# Patient Record
Sex: FEMALE | Race: WHITE | NOT HISPANIC OR LATINO | Employment: FULL TIME | URBAN - METROPOLITAN AREA
[De-identification: names, ages, dates, MRNs, and addresses within clinical notes are randomized per-mention and may not be internally consistent; named-entity substitution may affect disease eponyms.]

---

## 2015-03-27 LAB
HPV_EXT - HISTORICAL: NORMAL
PAP SMEAR - HIM PATIENT REPORTED: ABNORMAL

## 2017-01-11 ENCOUNTER — COMMUNICATION - HEALTHEAST (OUTPATIENT)
Dept: INTERNAL MEDICINE | Facility: CLINIC | Age: 47
End: 2017-01-11

## 2017-01-11 DIAGNOSIS — J32.9 SINUSITIS: ICD-10-CM

## 2017-10-11 ENCOUNTER — OFFICE VISIT - HEALTHEAST (OUTPATIENT)
Dept: INTERNAL MEDICINE | Facility: CLINIC | Age: 47
End: 2017-10-11

## 2017-10-11 DIAGNOSIS — J32.0 MAXILLARY SINUSITIS: ICD-10-CM

## 2017-10-11 ASSESSMENT — MIFFLIN-ST. JEOR: SCORE: 1145.91

## 2018-02-15 ENCOUNTER — COMMUNICATION - HEALTHEAST (OUTPATIENT)
Dept: SCHEDULING | Facility: CLINIC | Age: 48
End: 2018-02-15

## 2018-02-19 ENCOUNTER — COMMUNICATION - HEALTHEAST (OUTPATIENT)
Dept: INTERNAL MEDICINE | Facility: CLINIC | Age: 48
End: 2018-02-19

## 2018-02-19 ENCOUNTER — RECORDS - HEALTHEAST (OUTPATIENT)
Dept: GENERAL RADIOLOGY | Facility: CLINIC | Age: 48
End: 2018-02-19

## 2018-02-19 ENCOUNTER — OFFICE VISIT - HEALTHEAST (OUTPATIENT)
Dept: INTERNAL MEDICINE | Facility: CLINIC | Age: 48
End: 2018-02-19

## 2018-02-19 DIAGNOSIS — J32.9 SINUSITIS: ICD-10-CM

## 2018-02-19 DIAGNOSIS — R05.9 COUGH: ICD-10-CM

## 2018-02-19 DIAGNOSIS — J40 BRONCHITIS: ICD-10-CM

## 2018-02-19 ASSESSMENT — MIFFLIN-ST. JEOR: SCORE: 1114.16

## 2018-03-01 ENCOUNTER — COMMUNICATION - HEALTHEAST (OUTPATIENT)
Dept: SCHEDULING | Facility: CLINIC | Age: 48
End: 2018-03-01

## 2018-03-27 ENCOUNTER — RECORDS - HEALTHEAST (OUTPATIENT)
Dept: ADMINISTRATIVE | Facility: OTHER | Age: 48
End: 2018-03-27

## 2018-03-27 ENCOUNTER — TRANSFERRED RECORDS (OUTPATIENT)
Dept: HEALTH INFORMATION MANAGEMENT | Facility: CLINIC | Age: 48
End: 2018-03-27

## 2018-04-13 ENCOUNTER — TRANSFERRED RECORDS (OUTPATIENT)
Dept: HEALTH INFORMATION MANAGEMENT | Facility: CLINIC | Age: 48
End: 2018-04-13

## 2018-04-13 ENCOUNTER — RECORDS - HEALTHEAST (OUTPATIENT)
Dept: ADMINISTRATIVE | Facility: OTHER | Age: 48
End: 2018-04-13

## 2018-05-08 ENCOUNTER — TRANSFERRED RECORDS (OUTPATIENT)
Dept: HEALTH INFORMATION MANAGEMENT | Facility: CLINIC | Age: 48
End: 2018-05-08

## 2018-05-08 ENCOUNTER — RECORDS - HEALTHEAST (OUTPATIENT)
Dept: ADMINISTRATIVE | Facility: OTHER | Age: 48
End: 2018-05-08

## 2018-05-23 ENCOUNTER — RECORDS - HEALTHEAST (OUTPATIENT)
Dept: GENERAL RADIOLOGY | Facility: CLINIC | Age: 48
End: 2018-05-23

## 2018-05-23 ENCOUNTER — OFFICE VISIT - HEALTHEAST (OUTPATIENT)
Dept: INTERNAL MEDICINE | Facility: CLINIC | Age: 48
End: 2018-05-23

## 2018-05-23 DIAGNOSIS — R07.89 OTHER CHEST PAIN: ICD-10-CM

## 2018-05-23 DIAGNOSIS — Z12.31 VISIT FOR SCREENING MAMMOGRAM: ICD-10-CM

## 2018-05-23 DIAGNOSIS — I80.9 SUPERFICIAL PHLEBITIS: ICD-10-CM

## 2018-05-23 DIAGNOSIS — I83.90 VARICOSE VEIN OF LEG: ICD-10-CM

## 2018-05-23 DIAGNOSIS — R07.89 CHEST WALL PAIN: ICD-10-CM

## 2018-05-23 LAB
ANION GAP SERPL CALCULATED.3IONS-SCNC: 13 MMOL/L (ref 5–18)
BUN SERPL-MCNC: 12 MG/DL (ref 8–22)
CALCIUM SERPL-MCNC: 9.6 MG/DL (ref 8.5–10.5)
CHLORIDE BLD-SCNC: 103 MMOL/L (ref 98–107)
CO2 SERPL-SCNC: 24 MMOL/L (ref 22–31)
CREAT SERPL-MCNC: 0.71 MG/DL (ref 0.6–1.1)
D DIMER PPP FEU-MCNC: <=0.27 FEU UG/ML
ERYTHROCYTE [DISTWIDTH] IN BLOOD BY AUTOMATED COUNT: 10.8 % (ref 11–14.5)
GFR SERPL CREATININE-BSD FRML MDRD: >60 ML/MIN/1.73M2
GLUCOSE BLD-MCNC: 79 MG/DL (ref 70–125)
HCT VFR BLD AUTO: 41.5 % (ref 35–47)
HGB BLD-MCNC: 13.9 G/DL (ref 12–16)
MCH RBC QN AUTO: 30.9 PG (ref 27–34)
MCHC RBC AUTO-ENTMCNC: 33.4 G/DL (ref 32–36)
MCV RBC AUTO: 93 FL (ref 80–100)
PLATELET # BLD AUTO: 205 THOU/UL (ref 140–440)
PMV BLD AUTO: 7.6 FL (ref 7–10)
POTASSIUM BLD-SCNC: 3.9 MMOL/L (ref 3.5–5)
RBC # BLD AUTO: 4.48 MILL/UL (ref 3.8–5.4)
SODIUM SERPL-SCNC: 140 MMOL/L (ref 136–145)
WBC: 6.8 THOU/UL (ref 4–11)

## 2018-05-23 ASSESSMENT — MIFFLIN-ST. JEOR: SCORE: 1136.84

## 2019-02-06 ENCOUNTER — OFFICE VISIT - HEALTHEAST (OUTPATIENT)
Dept: INTERNAL MEDICINE | Facility: CLINIC | Age: 49
End: 2019-02-06

## 2019-02-06 DIAGNOSIS — J01.90 ACUTE SINUSITIS, RECURRENCE NOT SPECIFIED, UNSPECIFIED LOCATION: ICD-10-CM

## 2019-02-06 ASSESSMENT — MIFFLIN-ST. JEOR: SCORE: 1164.24

## 2019-02-13 ENCOUNTER — COMMUNICATION - HEALTHEAST (OUTPATIENT)
Dept: INTERNAL MEDICINE | Facility: CLINIC | Age: 49
End: 2019-02-13

## 2019-05-07 ENCOUNTER — TRANSFERRED RECORDS (OUTPATIENT)
Dept: HEALTH INFORMATION MANAGEMENT | Facility: CLINIC | Age: 49
End: 2019-05-07

## 2019-05-07 ENCOUNTER — RECORDS - HEALTHEAST (OUTPATIENT)
Dept: ADMINISTRATIVE | Facility: OTHER | Age: 49
End: 2019-05-07

## 2019-05-17 ENCOUNTER — OFFICE VISIT - HEALTHEAST (OUTPATIENT)
Dept: INTERNAL MEDICINE | Facility: CLINIC | Age: 49
End: 2019-05-17

## 2019-05-17 DIAGNOSIS — Z13.220 SCREENING FOR HYPERLIPIDEMIA: ICD-10-CM

## 2019-05-17 DIAGNOSIS — Z23 IMMUNIZATION DUE: ICD-10-CM

## 2019-05-17 DIAGNOSIS — H40.9 GLAUCOMA OF BOTH EYES, UNSPECIFIED GLAUCOMA TYPE: ICD-10-CM

## 2019-05-17 DIAGNOSIS — H65.491 CHRONIC MEE (MIDDLE EAR EFFUSION), RIGHT: ICD-10-CM

## 2019-05-17 DIAGNOSIS — Z00.00 ROUTINE GENERAL MEDICAL EXAMINATION AT A HEALTH CARE FACILITY: ICD-10-CM

## 2019-05-17 DIAGNOSIS — Z13.1 SCREENING FOR DIABETES MELLITUS: ICD-10-CM

## 2019-05-17 DIAGNOSIS — I73.00 RAYNAUD'S DISEASE WITHOUT GANGRENE: ICD-10-CM

## 2019-05-17 LAB
ALBUMIN SERPL-MCNC: 4.5 G/DL (ref 3.5–5)
ALP SERPL-CCNC: 49 U/L (ref 45–120)
ALT SERPL W P-5'-P-CCNC: 25 U/L (ref 0–45)
ANION GAP SERPL CALCULATED.3IONS-SCNC: 13 MMOL/L (ref 5–18)
AST SERPL W P-5'-P-CCNC: 26 U/L (ref 0–40)
BILIRUB SERPL-MCNC: 0.5 MG/DL (ref 0–1)
BUN SERPL-MCNC: 13 MG/DL (ref 8–22)
CALCIUM SERPL-MCNC: 9.8 MG/DL (ref 8.5–10.5)
CHLORIDE BLD-SCNC: 102 MMOL/L (ref 98–107)
CHOLEST SERPL-MCNC: 239 MG/DL
CO2 SERPL-SCNC: 24 MMOL/L (ref 22–31)
CREAT SERPL-MCNC: 0.72 MG/DL (ref 0.6–1.1)
ERYTHROCYTE [DISTWIDTH] IN BLOOD BY AUTOMATED COUNT: 10.8 % (ref 11–14.5)
ERYTHROCYTE [SEDIMENTATION RATE] IN BLOOD BY WESTERGREN METHOD: 3 MM/HR (ref 0–20)
FASTING STATUS PATIENT QL REPORTED: YES
GFR SERPL CREATININE-BSD FRML MDRD: >60 ML/MIN/1.73M2
GLUCOSE BLD-MCNC: 77 MG/DL (ref 70–125)
HBA1C MFR BLD: 4.8 % (ref 3.5–6)
HCT VFR BLD AUTO: 43.1 % (ref 35–47)
HDLC SERPL-MCNC: 66 MG/DL
HGB BLD-MCNC: 14.8 G/DL (ref 12–16)
HIV 1+2 AB+HIV1 P24 AG SERPL QL IA: NEGATIVE
LDLC SERPL CALC-MCNC: 159 MG/DL
MCH RBC QN AUTO: 30.9 PG (ref 27–34)
MCHC RBC AUTO-ENTMCNC: 34.3 G/DL (ref 32–36)
MCV RBC AUTO: 90 FL (ref 80–100)
PLATELET # BLD AUTO: 230 THOU/UL (ref 140–440)
PMV BLD AUTO: 7.4 FL (ref 7–10)
POTASSIUM BLD-SCNC: 4.4 MMOL/L (ref 3.5–5)
PROT SERPL-MCNC: 7.2 G/DL (ref 6–8)
RBC # BLD AUTO: 4.77 MILL/UL (ref 3.8–5.4)
SODIUM SERPL-SCNC: 139 MMOL/L (ref 136–145)
TRIGL SERPL-MCNC: 68 MG/DL
TSH SERPL DL<=0.005 MIU/L-ACNC: 1.45 UIU/ML (ref 0.3–5)
WBC: 5.3 THOU/UL (ref 4–11)

## 2019-05-17 ASSESSMENT — MIFFLIN-ST. JEOR: SCORE: 1154.98

## 2019-05-18 LAB — HCV AB SERPL QL IA: NEGATIVE

## 2019-05-21 LAB — ANA SER QL: 0.4 U

## 2019-05-23 ENCOUNTER — RECORDS - HEALTHEAST (OUTPATIENT)
Dept: ADMINISTRATIVE | Facility: OTHER | Age: 49
End: 2019-05-23

## 2019-09-13 ENCOUNTER — COMMUNICATION - HEALTHEAST (OUTPATIENT)
Dept: SCHEDULING | Facility: CLINIC | Age: 49
End: 2019-09-13

## 2019-10-30 ENCOUNTER — OFFICE VISIT - HEALTHEAST (OUTPATIENT)
Dept: INTERNAL MEDICINE | Facility: CLINIC | Age: 49
End: 2019-10-30

## 2019-10-30 DIAGNOSIS — Z12.31 VISIT FOR SCREENING MAMMOGRAM: ICD-10-CM
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
## 2019-10-30 DIAGNOSIS — S61.239A HYPODERMIC NEEDLESTICK INJURY OF FINGER: ICD-10-CM

## 2019-10-30 DIAGNOSIS — Z87.01 HISTORY OF PNEUMONIA: ICD-10-CM

## 2019-10-30 DIAGNOSIS — B02.9 HERPES ZOSTER WITHOUT COMPLICATION: ICD-10-CM

## 2019-10-30 DIAGNOSIS — I83.12 VARICOSE VEINS OF LEFT LOWER EXTREMITY WITH INFLAMMATION: ICD-10-CM

## 2019-10-30 LAB — HIV 1+2 AB+HIV1 P24 AG SERPL QL IA: NEGATIVE

## 2019-10-30 ASSESSMENT — MIFFLIN-ST. JEOR: SCORE: 1150.45

## 2019-10-31 LAB — HCV AB SERPL QL IA: NEGATIVE

## 2019-12-16 ENCOUNTER — RECORDS - HEALTHEAST (OUTPATIENT)
Dept: ADMINISTRATIVE | Facility: OTHER | Age: 49
End: 2019-12-16

## 2020-02-25 ENCOUNTER — RECORDS - HEALTHEAST (OUTPATIENT)
Dept: HEALTH INFORMATION MANAGEMENT | Facility: CLINIC | Age: 50
End: 2020-02-25

## 2020-06-10 ENCOUNTER — RECORDS - HEALTHEAST (OUTPATIENT)
Dept: ADMINISTRATIVE | Facility: OTHER | Age: 50
End: 2020-06-10

## 2020-06-10 ENCOUNTER — TRANSFERRED RECORDS (OUTPATIENT)
Dept: HEALTH INFORMATION MANAGEMENT | Facility: CLINIC | Age: 50
End: 2020-06-10

## 2020-06-12 ENCOUNTER — TRANSFERRED RECORDS (OUTPATIENT)
Dept: HEALTH INFORMATION MANAGEMENT | Facility: CLINIC | Age: 50
End: 2020-06-12

## 2020-06-13 LAB
ALT SERPL W/O P-5'-P-CCNC: 12 IU/L (ref 0–32)
AST SERPL-CCNC: 16 IU/L (ref 0–40)
CHOLEST SERPL-MCNC: 208 MG/DL (ref 100–199)
CREAT SERPL-MCNC: 0.64 MG/DL (ref 0.57–1)
HDLC SERPL-MCNC: 60 MG/DL
LDLC SERPL CALC-MCNC: 131 MG/DL (ref 0–99)
TRIGLYCERIDES (HISTORICAL CONVERSION): 83 MG/DL (ref 0–149)

## 2020-06-29 ENCOUNTER — OFFICE VISIT - HEALTHEAST (OUTPATIENT)
Dept: INTERNAL MEDICINE | Facility: CLINIC | Age: 50
End: 2020-06-29

## 2020-06-29 ENCOUNTER — COMMUNICATION - HEALTHEAST (OUTPATIENT)
Dept: SCHEDULING | Facility: CLINIC | Age: 50
End: 2020-06-29

## 2020-06-29 DIAGNOSIS — Z20.822 ENCOUNTER FOR LABORATORY TESTING FOR COVID-19 VIRUS: ICD-10-CM

## 2020-06-29 DIAGNOSIS — R07.1 CHEST PAIN ON BREATHING: ICD-10-CM

## 2020-06-29 LAB
ATRIAL RATE - MUSE: 54 BPM
DIASTOLIC BLOOD PRESSURE - MUSE: NORMAL
INTERPRETATION ECG - MUSE: NORMAL
P AXIS - MUSE: 68 DEGREES
PR INTERVAL - MUSE: 192 MS
QRS DURATION - MUSE: 84 MS
QT - MUSE: 414 MS
QTC - MUSE: 392 MS
R AXIS - MUSE: 75 DEGREES
SYSTOLIC BLOOD PRESSURE - MUSE: NORMAL
T AXIS - MUSE: 48 DEGREES
VENTRICULAR RATE- MUSE: 54 BPM

## 2020-06-29 ASSESSMENT — MIFFLIN-ST. JEOR: SCORE: 1150.45

## 2020-07-02 ENCOUNTER — COMMUNICATION - HEALTHEAST (OUTPATIENT)
Dept: LAB | Facility: CLINIC | Age: 50
End: 2020-07-02

## 2020-07-06 ENCOUNTER — AMBULATORY - HEALTHEAST (OUTPATIENT)
Dept: INTERNAL MEDICINE | Facility: CLINIC | Age: 50
End: 2020-07-06

## 2020-07-06 ENCOUNTER — COMMUNICATION - HEALTHEAST (OUTPATIENT)
Dept: INTERNAL MEDICINE | Facility: CLINIC | Age: 50
End: 2020-07-06

## 2020-07-08 ENCOUNTER — AMBULATORY - HEALTHEAST (OUTPATIENT)
Dept: INTERNAL MEDICINE | Facility: CLINIC | Age: 50
End: 2020-07-08

## 2020-07-08 DIAGNOSIS — R07.9 CHEST PAIN: ICD-10-CM

## 2020-07-09 ENCOUNTER — AMBULATORY - HEALTHEAST (OUTPATIENT)
Dept: INTERNAL MEDICINE | Facility: CLINIC | Age: 50
End: 2020-07-09

## 2020-07-09 ENCOUNTER — RECORDS - HEALTHEAST (OUTPATIENT)
Dept: HEALTH INFORMATION MANAGEMENT | Facility: CLINIC | Age: 50
End: 2020-07-09

## 2020-07-09 DIAGNOSIS — R07.9 CHEST PAIN: ICD-10-CM

## 2020-07-13 ENCOUNTER — HOSPITAL ENCOUNTER (OUTPATIENT)
Dept: CT IMAGING | Facility: CLINIC | Age: 50
Discharge: HOME | End: 2020-07-13
Attending: INTERNAL MEDICINE

## 2020-07-13 ENCOUNTER — COMMUNICATION - HEALTHEAST (OUTPATIENT)
Dept: INTERNAL MEDICINE | Facility: CLINIC | Age: 50
End: 2020-07-13

## 2020-07-13 DIAGNOSIS — R07.9 CHEST PAIN: ICD-10-CM

## 2020-07-13 LAB
CV CALCIUM SCORE AGATSTON LM: 0
CV CALCIUM SCORING AGATSON LAD: 40
CV CALCIUM SCORING AGATSTON CX: 0
CV CALCIUM SCORING AGATSTON RCA: 0
CV CALCIUM SCORING AGATSTON TOTAL: 40

## 2020-07-14 ENCOUNTER — COMMUNICATION - HEALTHEAST (OUTPATIENT)
Dept: INTERNAL MEDICINE | Facility: CLINIC | Age: 50
End: 2020-07-14

## 2020-07-24 ENCOUNTER — OFFICE VISIT - HEALTHEAST (OUTPATIENT)
Dept: INTERNAL MEDICINE | Facility: CLINIC | Age: 50
End: 2020-07-24
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
## 2020-07-24 DIAGNOSIS — R07.9 CHEST PAIN, UNSPECIFIED TYPE: ICD-10-CM

## 2020-07-24 DIAGNOSIS — I25.10 CORONARY ARTERY CALCIFICATION: ICD-10-CM

## 2020-07-24 DIAGNOSIS — R42 VERTIGO: ICD-10-CM

## 2020-08-03 ENCOUNTER — HOSPITAL ENCOUNTER (OUTPATIENT)
Dept: NUCLEAR MEDICINE | Facility: CLINIC | Age: 50
Discharge: HOME | End: 2020-08-03
Attending: INTERNAL MEDICINE

## 2020-08-03 ENCOUNTER — HOSPITAL ENCOUNTER (OUTPATIENT)
Dept: CARDIOLOGY | Facility: CLINIC | Age: 50
Discharge: HOME | End: 2020-08-03
Attending: INTERNAL MEDICINE

## 2020-08-03 DIAGNOSIS — R07.9 CHEST PAIN, UNSPECIFIED TYPE: ICD-10-CM

## 2020-08-03 DIAGNOSIS — I25.10 CORONARY ARTERY CALCIFICATION: ICD-10-CM

## 2020-08-03 LAB
CV STRESS CURRENT BP HE: NORMAL
CV STRESS CURRENT HR HE: 102
CV STRESS CURRENT HR HE: 103
CV STRESS CURRENT HR HE: 108
CV STRESS CURRENT HR HE: 109
CV STRESS CURRENT HR HE: 111
CV STRESS CURRENT HR HE: 113
CV STRESS CURRENT HR HE: 113
CV STRESS CURRENT HR HE: 118
CV STRESS CURRENT HR HE: 121
CV STRESS CURRENT HR HE: 123
CV STRESS CURRENT HR HE: 124
CV STRESS CURRENT HR HE: 144
CV STRESS CURRENT HR HE: 145
CV STRESS CURRENT HR HE: 148
CV STRESS CURRENT HR HE: 153
CV STRESS CURRENT HR HE: 156
CV STRESS CURRENT HR HE: 156
CV STRESS CURRENT HR HE: 59
CV STRESS CURRENT HR HE: 62
CV STRESS CURRENT HR HE: 73
CV STRESS CURRENT HR HE: 76
CV STRESS CURRENT HR HE: 76
CV STRESS CURRENT HR HE: 77
CV STRESS CURRENT HR HE: 77
CV STRESS CURRENT HR HE: 78
CV STRESS CURRENT HR HE: 78
CV STRESS CURRENT HR HE: 79
CV STRESS CURRENT HR HE: 79
CV STRESS CURRENT HR HE: 80
CV STRESS CURRENT HR HE: 83
CV STRESS CURRENT HR HE: 85
CV STRESS CURRENT HR HE: 96
CV STRESS CURRENT HR HE: 97
CV STRESS DEVIATION TIME HE: NORMAL
CV STRESS ECHO PERCENT HR HE: NORMAL
CV STRESS EXERCISE STAGE HE: NORMAL
CV STRESS EXERCISE STAGE REACHED HE: NORMAL
CV STRESS FINAL RESTING BP HE: NORMAL
CV STRESS FINAL RESTING HR HE: 78
CV STRESS MAX HR HE: 156
CV STRESS MAX TREADMILL GRADE HE: 20
CV STRESS MAX TREADMILL SPEED HE: 5.5
CV STRESS PEAK DIA BP HE: NORMAL
CV STRESS PEAK SYS BP HE: NORMAL
CV STRESS PHASE HE: NORMAL
CV STRESS PROTOCOL HE: NORMAL
CV STRESS RESTING PT POSITION HE: NORMAL
CV STRESS RESTING PT POSITION HE: NORMAL
CV STRESS ST DEVIATION AMOUNT HE: NORMAL
CV STRESS ST DEVIATION ELEVATION HE: NORMAL
CV STRESS ST EVELATION AMOUNT HE: NORMAL
CV STRESS TEST TYPE HE: NORMAL
CV STRESS TOTAL STAGE TIME MIN 1 HE: NORMAL
NUC STRESS EJECTION FRACTION: 64 %
RATE PRESSURE PRODUCT: NORMAL
STRESS ECHO BASELINE DIASTOLIC HE: 58
STRESS ECHO BASELINE HR: 57
STRESS ECHO BASELINE SYSTOLIC BP: 88
STRESS ECHO CALCULATED PERCENT HR: 91 %
STRESS ECHO LAST STRESS DIASTOLIC BP: 58
STRESS ECHO LAST STRESS HR: 156
STRESS ECHO LAST STRESS SYSTOLIC BP: 136
STRESS ECHO POST ESTIMATED WORKLOAD: 15.1
STRESS ECHO POST EXERCISE DUR MIN: 15
STRESS ECHO POST EXERCISE DUR SEC: 0
STRESS ECHO TARGET HR: 171

## 2020-08-11 ENCOUNTER — AMBULATORY - HEALTHEAST (OUTPATIENT)
Dept: INTERNAL MEDICINE | Facility: CLINIC | Age: 50
End: 2020-08-11

## 2020-08-11 ENCOUNTER — AMBULATORY - HEALTHEAST (OUTPATIENT)
Dept: NURSING | Facility: CLINIC | Age: 50
End: 2020-08-11

## 2020-08-11 ENCOUNTER — COMMUNICATION - HEALTHEAST (OUTPATIENT)
Dept: INTERNAL MEDICINE | Facility: CLINIC | Age: 50
End: 2020-08-11

## 2020-08-11 DIAGNOSIS — Z23 NEED FOR VACCINATION: ICD-10-CM

## 2020-08-17 ENCOUNTER — COMMUNICATION - HEALTHEAST (OUTPATIENT)
Dept: INTERNAL MEDICINE | Facility: CLINIC | Age: 50
End: 2020-08-17

## 2020-08-18 ENCOUNTER — COMMUNICATION - HEALTHEAST (OUTPATIENT)
Dept: INTERNAL MEDICINE | Facility: CLINIC | Age: 50
End: 2020-08-18

## 2020-10-22 ENCOUNTER — OFFICE VISIT - HEALTHEAST (OUTPATIENT)
Dept: INTERNAL MEDICINE | Facility: CLINIC | Age: 50
End: 2020-10-22

## 2020-10-22 DIAGNOSIS — Z23 IMMUNIZATION DUE: ICD-10-CM

## 2020-10-22 DIAGNOSIS — I25.10 CORONARY ARTERY CALCIFICATION: ICD-10-CM

## 2020-10-22 ASSESSMENT — MIFFLIN-ST. JEOR: SCORE: 1154.98

## 2020-11-30 ENCOUNTER — COMMUNICATION - HEALTHEAST (OUTPATIENT)
Dept: INTERNAL MEDICINE | Facility: CLINIC | Age: 50
End: 2020-11-30

## 2020-12-22 ENCOUNTER — AMBULATORY - HEALTHEAST (OUTPATIENT)
Dept: NURSING | Facility: CLINIC | Age: 50
End: 2020-12-22

## 2020-12-22 DIAGNOSIS — Z23 IMMUNIZATION DUE: ICD-10-CM

## 2021-01-05 ENCOUNTER — TRANSFERRED RECORDS (OUTPATIENT)
Dept: HEALTH INFORMATION MANAGEMENT | Facility: CLINIC | Age: 51
End: 2021-01-05

## 2021-01-20 ENCOUNTER — COMMUNICATION - HEALTHEAST (OUTPATIENT)
Dept: INTERNAL MEDICINE | Facility: CLINIC | Age: 51
End: 2021-01-20

## 2021-01-28 ENCOUNTER — COMMUNICATION - HEALTHEAST (OUTPATIENT)
Dept: INTERNAL MEDICINE | Facility: CLINIC | Age: 51
End: 2021-01-28

## 2021-02-04 ENCOUNTER — COMMUNICATION - HEALTHEAST (OUTPATIENT)
Dept: INTERNAL MEDICINE | Facility: CLINIC | Age: 51
End: 2021-02-04

## 2021-02-08 ENCOUNTER — COMMUNICATION - HEALTHEAST (OUTPATIENT)
Dept: INTERNAL MEDICINE | Facility: CLINIC | Age: 51
End: 2021-02-08

## 2021-02-21 ENCOUNTER — COMMUNICATION - HEALTHEAST (OUTPATIENT)
Dept: INTERNAL MEDICINE | Facility: CLINIC | Age: 51
End: 2021-02-21

## 2021-04-22 ENCOUNTER — COMMUNICATION - HEALTHEAST (OUTPATIENT)
Dept: INTERNAL MEDICINE | Facility: CLINIC | Age: 51
End: 2021-04-22

## 2021-05-28 NOTE — PROGRESS NOTES
Office Visit - Physical   Yuliya Caro   48 y.o.  female    Date of visit: 5/17/2019  Physician: Ganesh Oquendo MD     Assessment and Plan   1. Routine general medical examination at a health care facility  This is a healthy 48-year-old woman with issues as discussed below.  Ongoing healthy lifestyle discussed and recommended    2. Screening for diabetes mellitus  - Glycosylated Hemoglobin A1c    3. Screening for hyperlipidemia  - Lipid Cascade    4. Glaucoma of both eyes, unspecified glaucoma type  Follow-up with ophthalmology as scheduled, continue drops    5. Chronic FRIDA (middle ear effusion), right  - Ambulatory referral to ENT    6. Raynaud's disease without gangrene  Discussed importance of keeping her core warm and her hands warm, discussed hand warmers.  Discussed different medications and at this point I do not think she tolerates them with her low blood pressure.  - HM2(CBC w/o Differential)  - Comprehensive Metabolic Panel  - Thyroid Cascade  - Erythrocyte Sedimentation Rate  - Antinuclear Antibody (PRUDENCIO) Cascade  - HIV Antigen/Antibody Screening Cascade  - Hepatitis C Antibody (Anti-HCV)    7. Immunization due  - Td, Preservative Free (green label)    Return in about 1 year (around 5/17/2020) for annual physical.     Chief Complaint   Chief Complaint   Patient presents with     Annual Exam     fasting     Fatigue        Patient Profile   Social History     Social History Narrative    Lives with her , Kendrick.  Two children, Dena (2000) [facility], and Cam (2002) [facility].  Actor/talent.          Past Medical History   Patient Active Problem List   Diagnosis     Vertigo     S/P hysterectomy     Glaucoma       Past Surgical History  She has a past surgical history that includes pr removal of ovarian cyst(s) (Left, 1984); pr ligate fallopian tube; and Laparoscopic vaginal hysterectomy (2016).     History of Present Illness   This 48 y.o. old woman comes in for annual physical.   Her medical history is reviewed, electronic medical records obtained reflectance no.  She has 2 main complaints today.  She always feels cold in the winter.  She will have hands at turn white.  No history of ulcers.  She does not smoke.  This generally only happens in the winter.  She tries to dress in layers and keep her core warm.  She would like to move to warmer state.  No history of inflammatory joint disease.  No history of inflammatory eye conditions.  No history of pleurisy.  No history of inflammatory skin lesions or chronic kidney disease, pericarditis.  She also has intermittent vertigo and most currently has plugging of her right ear.  She has seen ENT in the past for this.    Review of Systems: A comprehensive review of systems was negative except as noted.     Medications and Allergies   Current Outpatient Medications   Medication Sig Dispense Refill     nitrofurantoin, macrocrystal-monohydrate, (MACROBID) 100 MG capsule Take 1 cap by mouth after intercourse.  3     OMEGA-3/DHA/EPA/FISH OIL (FISH OIL-OMEGA-3 FATTY ACIDS) 300-1,000 mg capsule Take 2 g by mouth daily.       timolol maleate (TIMOPTIC) 0.5 % ophthalmic solution INSTILL 1 DROP BY OPTHALMIC ROUTE EVERY MORNING IN BOTH EYES  3     No current facility-administered medications for this visit.      Allergies   Allergen Reactions     Sulfa (Sulfonamide Antibiotics) Rash and Hives        Family and Social History   Family History   Problem Relation Age of Onset     Parkinsonism Mother 70     Breast cancer Mother      Other Father         aging     Alcohol abuse Sister      Cirrhosis Sister      Irritable bowel syndrome Daughter      Lactose intolerance Daughter      No Medical Problems Brother      No Medical Problems Son         Social History     Tobacco Use     Smoking status: Never Smoker     Smokeless tobacco: Never Used   Substance Use Topics     Alcohol use: Never     Frequency: Never     Drug use: Never        Physical Exam   General  "Appearance:   No acute distress    /64 (Patient Site: Left Arm, Patient Position: Sitting, Cuff Size: Adult Regular)   Pulse (!) 59   Ht 5' 5.5\" (1.664 m)   Wt 116 lb (52.6 kg)   SpO2 99%   BMI 19.01 kg/m      EYES: Eyelids, conjunctiva, and sclera were normal. Pupils were normal. Cornea, iris, and lens were normal bilaterally.  HEAD, EARS, NOSE, MOUTH, AND THROAT: Head and face were normal. Hearing was normal to voice and the ears were normal to external exam.  She does have fluid behind her right tympanic membrane.  Nose appearance was normal and there was no discharge. Oropharynx was normal.  NECK: Neck appearance was normal. There were no neck masses and the thyroid was not enlarged.  RESPIRATORY: Breathing pattern was normal and the chest moved symmetrically.  Percussion/auscultatory percussion was normal.  Lung sounds were normal and there were no abnormal sounds.  CARDIOVASCULAR: Heart rate and rhythm were normal.  S1 and S2 were normal and there were no extra sounds or murmurs. Peripheral pulses in arms and legs were normal.  Jugular venous pressure was normal.  There was no peripheral edema.  GASTROINTESTINAL: The abdomen was normal in contour.  Bowel sounds were present.  Percussion detected no organ enlargement or tenderness.  Palpation detected no tenderness, mass, or enlarged organs.   MUSCULOSKELETAL: Skeletal configuration was normal and muscle mass was normal for age. Joint appearance was overall normal.  LYMPHATIC: There were no enlarged nodes.  SKIN/HAIR/NAILS: Skin color was normal.  There were no skin lesions.  Hair and nails were normal.  NEUROLOGIC: The patient was alert and oriented to person, place, time, and circumstance. Speech was normal. Cranial nerves were normal. Motor strength was normal for age. The patient was normally coordinated.  PSYCHIATRIC:  Mood and affect were normal and the patient had normal recent and remote memory. The patient's judgment and insight were " normal.       Additional Information        Ganesh Oquendo MD  Internal Medicine  Contact me at [phone]

## 2021-05-31 VITALS — BODY MASS INDEX: 18.32 KG/M2 | HEIGHT: 66 IN | WEIGHT: 114 LBS

## 2021-06-01 VITALS — BODY MASS INDEX: 18 KG/M2 | WEIGHT: 112 LBS | HEIGHT: 66 IN

## 2021-06-01 VITALS — BODY MASS INDEX: 17.19 KG/M2 | HEIGHT: 66 IN | WEIGHT: 107 LBS

## 2021-06-01 NOTE — TELEPHONE ENCOUNTER
Patient calling amanda swollen lymph node in her groin.    She was advised to make an appointment.    Made appointment to be seen on 09/24/2019 @ 0800.    Faye Gross RN  [facility] Triage/refill nurse

## 2021-06-02 VITALS — HEIGHT: 66 IN | WEIGHT: 118.04 LBS | BODY MASS INDEX: 18.97 KG/M2

## 2021-06-02 NOTE — PROGRESS NOTES
Office Visit - Follow Up   Yuliya Caro   49 y.o. female    Date of Visit: 10/30/2019    Chief Complaint   Patient presents with     Immunizations        Assessment and Plan   1. Hypodermic needlestick injury of finger  She is outside the window for post exposure prophylaxis for HIV.  We will check antibodies now and at 6 months and at one year.  - Hepatitis C Antibody (Anti-HCV)  - HIV Antigen/Antibody Screening Cascade    2. History of pneumonia  Discussed indications for pneumococcal vaccination.  Given her history of pneumonia I think it would be reasonable to give her a pneumonia vaccine.  I discussed with her that this generally would be the Prevnar vaccine but that this is a once a lifetime vaccine.  She would prefer to wait until she is 65 she received this vaccine.  We therefore agreed to use Pneumovax now and in 10 years and then at age 70 for the final vaccination.    3. Herpes zoster without complication  She will get a shingles vaccine at age 50 per routine    4. Visit for screening mammogram    5. Varicose veins of left lower extremity with inflammation  Gudelia use of compression garments, offered evaluation in our vein clinic which she declined.    Return in about 6 months (around 4/30/2020) for recheck.     History of Present Illness   This 49 y.o. old woman comes in with a list of complaints.  We spent quite a bit of time going through her list and discussing in detail.  3 weeks ago she was taking a community acupuncture class when she had a needlestick.  She is unaware of HIV or hepatitis C status of the patient/client.  She did not previously seek medical attention.  She is worried about pneumonia and would like to get pneumonia vaccine.  She has had a history of pneumonia.  She is never been hospitalized with complications from pneumococcal pneumonia.  She was recently on a bike trip in [place] and developed pain and a rash in her left buttock area.  She had some swollen lymph nodes on the left  "as well he saw a physician in [facility] who diagnosed her with a spider bite.  There was in the emergency room physician on her bike ride who felt she had shingles.  She is wondering about shingles vaccine.  She also has a varicose vein in her left shin.  It does not really bother her except for when she gets a deep massage.  She wonders if there is anything that can be done    Review of Systems: A comprehensive review of systems was negative except as noted.     Medications, Allergies and Problem List   Reviewed, reconciled and updated  Post Discharge Medication Reconciliation Status:      Physical Exam   General Appearance:   No acute distress    /64 (Patient Site: Left Arm, Patient Position: Sitting, Cuff Size: Adult Regular)   Pulse 68   Ht 5' 5.5\" (1.664 m)   Wt 115 lb (52.2 kg)   SpO2 95%   BMI 18.85 kg/m      HEENT exam is unremarkable  Neck supple no thyromegaly or nodule palpable  Lymphatic no cervical lymphadenopathy  Cardiovascular regular rate and rhythm no murmur gallop or rub  Pulmonary lungs are clear to auscultation bilaterally  Gastrointestinal abdomen soft nontender nondistended no organomegaly  Neurologic exam is non focal  Psychiatric pleasant, no confusion or agitation   She has healing evidence of shingles on her left buttocks.  She does have a small varicose vein on her left shin     Additional Information   Current Outpatient Medications   Medication Sig Dispense Refill     nitrofurantoin, macrocrystal-monohydrate, (MACROBID) 100 MG capsule Take 1 cap by mouth after intercourse.  3     OMEGA-3/DHA/EPA/FISH OIL (FISH OIL-OMEGA-3 FATTY ACIDS) 300-1,000 mg capsule Take 2 g by mouth daily.       timolol maleate (TIMOPTIC) 0.5 % ophthalmic solution INSTILL 1 DROP BY OPTHALMIC ROUTE EVERY MORNING IN BOTH EYES  3     No current facility-administered medications for this visit.      Allergies   Allergen Reactions     Sulfa (Sulfonamide Antibiotics) Rash and Hives     Social History "     Tobacco Use     Smoking status: Never Smoker     Smokeless tobacco: Never Used   Substance Use Topics     Alcohol use: Never     Frequency: Never     Drug use: Never       Review and/or order of clinical lab tests:  Review and/or order of radiology tests:  Review and/or order of medicine tests:  Discussion of test results with performing physician:  Decision to obtain old records and/or obtain history from someone other than the patient:  Review and summarization of old records and/or obtaining history from someone other than the patient and.or discussion of case with another health care provider:  Independent visualization of image, tracing or specimen itself:    Time: total time spent with the patient was 40 minutes of which >50% was spent in counseling and coordination of care discussing the above issues     Ganesh Oquendo MD

## 2021-06-03 VITALS — HEIGHT: 66 IN | BODY MASS INDEX: 18.64 KG/M2 | WEIGHT: 116 LBS

## 2021-06-03 VITALS
SYSTOLIC BLOOD PRESSURE: 104 MMHG | BODY MASS INDEX: 18.48 KG/M2 | DIASTOLIC BLOOD PRESSURE: 64 MMHG | HEIGHT: 66 IN | OXYGEN SATURATION: 95 % | WEIGHT: 115 LBS | HEART RATE: 68 BPM

## 2021-06-04 VITALS
SYSTOLIC BLOOD PRESSURE: 128 MMHG | DIASTOLIC BLOOD PRESSURE: 86 MMHG | OXYGEN SATURATION: 98 % | HEIGHT: 66 IN | BODY MASS INDEX: 18.48 KG/M2 | HEART RATE: 62 BPM | WEIGHT: 115 LBS

## 2021-06-05 VITALS
OXYGEN SATURATION: 97 % | BODY MASS INDEX: 18.64 KG/M2 | HEIGHT: 66 IN | SYSTOLIC BLOOD PRESSURE: 104 MMHG | WEIGHT: 116 LBS | DIASTOLIC BLOOD PRESSURE: 70 MMHG | HEART RATE: 62 BPM

## 2021-06-09 NOTE — PROGRESS NOTES
Office Visit - Follow Up   Yuliya Caro   49 y.o. female    Date of Visit: 6/29/2020         Assessment and Plan   1. Chest pain on breathing  EKG is completely benign.  She does have a remote history of stress testing which is negative.  Given her superb lifestyle and no other personal risk factors including she goes biking which actually makes her feel better.  This is very unlikely to be angina.  She also has no systemic signs of fever cough or infection to merit a chest x-ray at this point.  Her examination is completely normal.  She is she is concerned about her mildly elevated lipids.  I did tell her her a ASCVD score is very low.  But because she does have a positive family history we could do a low dose coronary calcium score.  This will allow us to see if she has any burden of atherosclerotic plaque and will do a chest CT view as well to make sure she does not have any lung disease.  Of note she did have a history of recurrent pneumonias there is possibility that she has some pleuritic changes of fibrotic changes and I think instead of doing a full CT chest with full dose radiation this should be helpful in screening and assessing.  I did reassure her that stress testing and angiogram is not necessary.  If chest pain persists we could consider stress echo.  The other thing to notice that she has been anxious anxiety can play a role.  - Electrocardiogram Perform and Read               History of Present Illness   This 49 y.o. old   Chief Complaint   Patient presents with     Chest Pain     Chest pain x 6 days, mostly on left side, had slight right sided yesterday, sharp if she takes a deep breath    many years ago had 3 day h/o vertigo, MRI brain , had one demylelinating lesion went to [place] and had extesnive work up done and ( even spinal tap ) finally diagnosed with vestibular neuronitis . Has had recurrent episodes since then . Cannot lie down on right side . Still is super athletic , bikes long  "distances.  Triggers are positional changes.  She feels that she is concerned that COVID may be causing her chest pain.  And may be related to poor circulation and her vertigo as well.  She does not feel faint and has had no syncopal episodes symptoms are actually more suggestive of vestibular migraine    Review of Systems: A comprehensive review of systems was negative except as noted.     Medications, Allergies and Problem List   Reviewed, reconciled and updated  Patient Active Problem List   Diagnosis     Vertigo     S/P hysterectomy     Glaucoma     Varicose veins of left lower extremity with inflammation     Diverticular disease of large intestine     Polyp of colon        Physical Exam   General Appearance:       /86 (Patient Site: Right Arm, Patient Position: Sitting, Cuff Size: Adult Large)   Pulse 62   Ht 5' 5.5\" (1.664 m)   Wt 115 lb (52.2 kg)   SpO2 98%   BMI 18.85 kg/m      General appearance - alert, well appearing, and in no distress  Mental status - alert, oriented to person, place, and time  Neck - supple, no significant adenopathy  Lymphatics - no palpable lymphadenopathy, no hepatosplenomegaly  Chest - clear to auscultation, no wheezes, rales or rhonchi, symmetric air entry  Heart - normal rate, regular rhythm, normal S1, S2, no murmurs, rubs, clicks or gallops  Abdomen - soft, nontender, nondistended, no masses or organomegaly  Musculoskeletal - no joint tenderness, deformity or swelling  Extremities - peripheral pulses normal, no pedal edema, no clubbing or cyanosis  Skin - normal coloration and turgor, no rashes, no suspicious skin lesions noted                                                                                       Additional Information   Current Outpatient Medications   Medication Sig Dispense Refill     nitrofurantoin, macrocrystal-monohydrate, (MACROBID) 100 MG capsule Take 1 cap by mouth after intercourse.  3     OMEGA-3/DHA/EPA/FISH OIL (FISH OIL-OMEGA-3 FATTY " ACIDS) 300-1,000 mg capsule Take 2 g by mouth daily.       timolol maleate (TIMOPTIC) 0.5 % ophthalmic solution INSTILL 1 DROP BY OPTHALMIC ROUTE EVERY MORNING IN BOTH EYES  3     No current facility-administered medications for this visit.      Allergies   Allergen Reactions     Sulfa (Sulfonamide Antibiotics) Rash and Hives     Social History     Social History Narrative    Lives with her , Kendrick.  Two children, Dena (2000) [facility], and Cam (2002) [facility].  Actor/talent.        reports that she has never smoked. She has never used smokeless tobacco. She reports that she does not drink alcohol or use drugs.   No past medical history on file.           Crystal Faith MD

## 2021-06-09 NOTE — TELEPHONE ENCOUNTER
Spoke with the patient and relayed message below from Dr. Faith.  She verbalized understanding and had no further questions at this time.  Yen RODRIGUEZ, WILL/CMT....................12:22 PM

## 2021-06-09 NOTE — TELEPHONE ENCOUNTER
Spoke with the patient and relayed message below from Dr. Oquendo.  She verbalized understanding and had no further questions at this time.  Yen RODRIGUEZ, WILL/CMT....................8:46 AM

## 2021-06-09 NOTE — TELEPHONE ENCOUNTER
Yuliya is having mild chest pains for five or six days.  Sometimes it is sharp and also mild.  Denies fever and cough and shortness of breath.     COVID 19 Nurse Triage Plan/Patient Instructions    Please be aware that novel coronavirus (COVID-19) may be circulating in the community. If you develop symptoms such as fever, cough, or SOB or if you have concerns about the presence of another infection including coronavirus (COVID-19), please contact your health care provider or visit [email or web address].     Disposition/Instructions    Patient to schedule an In Person Visit with provider. Reference Visit Selection Guide.    Thank you for taking steps to prevent the spread of this virus.  o Limit your contact with others.  o Wear a simple mask to cover your cough.  o Wash your hands well and often.    Resources    [facility]: About COVID-19: [email or web address]    CDC: What to Do If You're Sick: www.cdc.gov/coronavirus/2019-ncov/about/steps-when-sick.html    CDC: Ending Home Isolation: www.cdc.gov/coronavirus/2019-ncov/hcp/disposition-in-home-patients.html     CDC: Caring for Someone: www.cdc.gov/coronavirus/2019-ncov/if-you-are-sick/care-for-someone.html     [facility]: Interim Guidance for Hospital Discharge to Home: www.health.[facility].[place].us/diseases/coronavirus/hcp/hospdischarge.pdf    [facility] clinical trials (COVID-19 research studies): [email or web address]-clinical-trials     Below are the COVID-19 hotlines at the [facility] of Health ([facility]). Interpreters are available.   o For health questions: Call 446-498-7161 or 1-726.726.5696 (7 a.m. to 7 p.m.)  o For questions about schools and childcare: Call 668-696-5950 or 1-739.965.8932 (7 a.m. to 7 p.m.)        Reason for Disposition    [1] Chest pain lasting <= 5 minutes AND [2] NO chest pain or cardiac symptoms now(Exceptions: pains lasting a few seconds)    Additional Information    Negative: Severe difficulty breathing (e.g.,  "struggling for each breath, speaks in single words)    Negative: Difficult to awaken or acting confused (e.g., disoriented, slurred speech)    Negative: Shock suspected (e.g., cold/pale/clammy skin, too weak to stand, low BP, rapid pulse)    Negative: [1] Chest pain lasts > 5 minutes AND [2] history of heart disease  (i.e., heart attack, bypass surgery, angina, angioplasty, CHF; not just a heart murmur)    Negative: [1] Chest pain lasts > 5 minutes AND [2] described as crushing, pressure-like, or heavy    Negative: [1] Chest pain lasts > 5 minutes AND [2] age > 50    Negative: [1] Chest pain lasts > 5 minutes AND [2] age > 30 AND [3] at least one cardiac risk factor (i.e., hypertension, diabetes, obesity, smoker or strong family history of heart disease)    Negative: [1] Chest pain lasts > 5 minutes AND [2] not relieved with nitroglycerin    Negative: Passed out (i.e., lost consciousness, collapsed and was not responding)    Negative: Heart beating < 50 beats per minute OR > 140 beats per minute    Negative: Visible sweat on face or sweat dripping down face    Negative: Sounds like a life-threatening emergency to the triager    Negative: SEVERE chest pain    Negative: [1] Intermittent  chest pain or \"angina\" AND [2] increasing in severity or frequency  (Exception: pains lasting a few seconds)    Negative: Pain also present in shoulder(s) or arm(s) or jaw  (Exception: pain is clearly made worse by movement)    Negative: Difficulty breathing    Negative: Dizziness or lightheadedness    Negative: Coughing up blood    Negative: Cocaine use within last 3 days    Negative: History of prior \"blood clot\" in leg or lungs (i.e., deep vein thrombosis, pulmonary embolism)    Negative: Recent illness requiring prolonged bedrest (i.e., immobilization)    Negative: Hip or leg fracture in past 2 months (e.g., had cast on leg or ankle)    Negative: Major surgery in the past month    Negative: Recent long-distance travel with " prolonged time in car, bus, plane, or train (i.e., within past 2 weeks; 6 or  more hours duration)    Negative: Chest pain lasts > 5 minutes (Exceptions: chest pain occurring > 3 days ago and now asymptomatic; same as previously diagnosed heartburn and has accompanying sour taste in mouth)    Negative: Taking a deep breath makes pain worse    Negative: Patient sounds very sick or weak to the triager    Negative: [1] Chest pain lasts > 5 minutes AND [2] occurred > 3 days ago (72 hours) AND [3] NO chest pain or cardiac symptoms now    Protocols used: CHEST PAIN-A-AH

## 2021-06-09 NOTE — TELEPHONE ENCOUNTER
Patient Returning Call  Reason for call:  Patient called back.  Information relayed to patient:  n/a  Patient has additional questions:  Yes  If YES, what are your questions/concerns:  Calling on the status of this message as no one has called her back to set up an appointment. Do not see any order for patient regarding Dr Faith's message listed below.  Please call patient when ordered or if not going to order.  Okay to leave a detailed message?: Yes

## 2021-06-09 NOTE — TELEPHONE ENCOUNTER
Test Results  Who is calling?:  Patient  Who ordered the test:     Crystal Faith MD  Type of test: Lab  Date of test:  7/13/2020  Where was the test performed:  7/13/2020  What are your questions/concerns?:    Patient is requesting Dr Oquendo read over the Patient Message and CT Calcium Score in Encounters, 7/13/2020.  Patient is questioning her next steps in her healthcare.  Please reach out to patient and advise.  Thank you.  Okay to leave a detailed message?:  Yes

## 2021-06-09 NOTE — TELEPHONE ENCOUNTER
Question following Office Visit  When did you see your provider: 6/29/2020  What is your question: Patient thought a diagnostic chest CT was going to be ordered but no one has called her. Reports still having pain in her chest. Unable to locate order. Please advise asap!  Okay to leave a detailed message: Yes

## 2021-06-09 NOTE — TELEPHONE ENCOUNTER
Per verbal from Dr. Faith order for CT coronary calcium score has been ordered.    Spoke with the patient and let her know that it is all ordered and scheduling will be calling her.  She verbalized understanding and had no further questions at this time.  Yen RODRIGUEZ CMA/ERIKA....................4:06 PM

## 2021-06-09 NOTE — PROGRESS NOTES
"Yuliya Caro is a 49 y.o. female who is being evaluated via a billable video visit.      The patient has been notified of following:     \"This video visit will be conducted via a call between you and your physician/provider. We have found that certain health care needs can be provided without the need for an in-person physical exam.  This service lets us provide the care you need with a video conversation.  If a prescription is necessary we can send it directly to your pharmacy.  If lab work is needed we can place an order for that and you can then stop by our lab to have the test done at a later time.    Video visits are billed at different rates depending on your insurance coverage. Please reach out to your insurance provider with any questions.    If during the course of the call the physician/provider feels a video visit is not appropriate, you will not be charged for this service.\"    Patient has given verbal consent to a Video visit? Yes  How would you like to obtain your AVS? AVS Preference: Mail a copy.  If dropped by the video visit, the video invitation should be sent to: Text to cell phone: [phone]  Will anyone else be joining your video visit? No    Video Start Time: 3:20 PM    Additional provider notes: GENERAL: Healthy, alert and no distress  EYES: Eyes grossly normal to inspection. No discharge or erythema, or obvious scleral/conjunctival abnormalities.  RESP: No audible wheeze, cough, or visible cyanosis.  No visible retractions or increased work of breathing.    NEURO: Cranial nerves grossly intact. Mentation and speech appropriate for age.  PSYCH: Mentation appears normal, affect normal/bright, judgement and insight intact, normal speech and appearance well-groomed      Video-Visit Details    Type of service:  Video Visit    Video End Time (time video stopped): 3:52 PM  Originating Location (pt. Location): Home    Distant Location (provider location):  [facility]" MEDICINE     Platform used for Video Visit: Peter Oquendo MD       Video Visit - Follow Up   Yuliya Caro   49 y.o. female    Date of Visit: 7/24/2020    Chief Complaint   Patient presents with     Test Results     CT Calcium score        Assessment and Plan   1. Chest pain, unspecified type  Given her chest pain and now do not ASCVD, recommend further assessment with stress test  - NM Exercise Stress Test; Future    2. Coronary artery calcification  Discussed statin at length, will start low-dose statin follow-up 3 months  - NM Exercise Stress Test; Future  - rosuvastatin (CRESTOR) 5 MG tablet; Take 1 tablet (5 mg total) by mouth daily.  Dispense: 90 tablet; Refill: 3  - Lipid Cascade; Future    3. Accident caused by hypodermic needle, subsequent encounter  - HIV Antigen/Antibody Screening Cascade; Future  - Hepatitis C Antibody (Anti-HCV); Future    4. Vertigo  We will discuss in 3 months    Return in about 3 months (around 10/24/2020) for recheck.     History of Present Illness   This 49 y.o. old woman is seen for follow-up of chest pain and imaging studies that she had done previously.  She has been having intermittent chest pain.  This is a sharp pain.  Worse with movement.  No associated shortness of breath.  No chest tightness.  No radiation to her arm.  Can migrate throughout her chest.  No pleuritic nature.  No cough.  She had a CT calcium score which showed some calcification of the LAD artery.  She is quite healthy.  Does not smoke no high blood pressure.  She does have high cholesterol.  She is somewhat opposed to taking a statin medication.  Also wants to discuss follow-up of needlestick she had last year.  Chronic vertigo wonders about repeat MRI    Review of Systems: A comprehensive review of systems was negative except as noted.     Medications, Allergies and Problem List   Reviewed, reconciled and updated  Post Discharge Medication Reconciliation Status:      Additional  Information   Current Outpatient Medications   Medication Sig Dispense Refill     nitrofurantoin, macrocrystal-monohydrate, (MACROBID) 100 MG capsule Take 1 cap by mouth after intercourse.  3     OMEGA-3/DHA/EPA/FISH OIL (FISH OIL-OMEGA-3 FATTY ACIDS) 300-1,000 mg capsule Take 2 g by mouth daily.       timolol maleate (TIMOPTIC) 0.5 % ophthalmic solution INSTILL 1 DROP BY OPTHALMIC ROUTE EVERY MORNING IN BOTH EYES  3     rosuvastatin (CRESTOR) 5 MG tablet Take 1 tablet (5 mg total) by mouth daily. 90 tablet 3     No current facility-administered medications for this visit.      Allergies   Allergen Reactions     Sulfa (Sulfonamide Antibiotics) Rash and Hives     Social History     Tobacco Use     Smoking status: Never Smoker     Smokeless tobacco: Never Used   Substance Use Topics     Alcohol use: Never     Frequency: Never     Drug use: Never       Review and/or order of clinical lab tests:  Review and/or order of radiology tests:  Review and/or order of medicine tests:  Discussion of test results with performing physician:  Decision to obtain old records and/or obtain history from someone other than the patient:  Review and summarization of old records and/or obtaining history from someone other than the patient and.or discussion of case with another health care provider:  Independent visualization of image, tracing or specimen itself:    Time:      Ganesh Oquendo MD

## 2021-06-09 NOTE — TELEPHONE ENCOUNTER
Dr. Oquendo,  Spoke with the patient and she is aware that you are out of the clinic this week, but is requesting a virtual visit to go over her test results.  She has been scheduled for Friday, 7/24/2020, but if you would like her scheduled for a virtual visit sooner she would be okay with that too.    Please advise.  Thank you.  Yne RODRIGUEZ CMA/ERIKA....................1:06 PM

## 2021-06-09 NOTE — TELEPHONE ENCOUNTER
I wanted a low dose CT coroanry caclium score . Not sure why the order didn't go through because I wrote it in my note but I did order and sign it now . Remind pt that this is a low dose test so will not be covered by insurance and she should check the cost before making the appointment but is the best way to check both for cholesterol plaque and lung problems . The other option is a full dose ct chest with contrast but she may still have a deductible to pay for that

## 2021-06-09 NOTE — TELEPHONE ENCOUNTER
Dr. Faith,  It looks like you saw this patient on 6/29/2020 and mentioned a chest CT in your note, but there is nothing ordered.  Please advise.  Thank you.  Yen RODRIGUEZ CMA/ERIKA....................11:42 AM

## 2021-06-10 NOTE — TELEPHONE ENCOUNTER
Who is calling:  Patient   Reason for Call:  Patient stated that she would like a MMR boost as soon as possible. Patient stated if it's ok with Ganesh Oquendo MD to let her know so she can schedule a nurse visit to have this done.  Date of last appointment with primary care: n/a  Okay to leave a detailed message: Yes  [phone]

## 2021-06-10 NOTE — TELEPHONE ENCOUNTER
Dr. Oquendo,  Please review message below and advise.  I don't see a record of when the patient had her last MMR.  Please advise.  Thank you.  Yen RODRIGUEZ, CMA/CMT....................11:16 AM

## 2021-06-10 NOTE — TELEPHONE ENCOUNTER
For the letter of medical necessity you will need to specify why this test is needed and what you are trying to rule out. We don't have a template for this type of letter.

## 2021-06-10 NOTE — TELEPHONE ENCOUNTER
Order for MMR booster has been set up for Dr. Oquendo to review per message below.    Spoke with the patient and helped her to schedule a nurse only visit for today.    Yen RODRIGUEZ, WILL/ERIKA....................1:10 PM

## 2021-06-10 NOTE — TELEPHONE ENCOUNTER
I don't think the chest pain is related to the heart, given the normal stress test.  Would she be able to make in an in clinic appt?

## 2021-06-10 NOTE — TELEPHONE ENCOUNTER
Who is requesting the letter?  Patient  Why is the letter needed? Patient stated that Ganesh Oquendo MD had recommended she get a stress test done at [facility]. Patient stated that her insurance is denying this. Patient stated she will need a letter to her insurance stating that this was recommended by Ganesh Oquendo MD and thought it was necessary to rule out heart issues for the patient.  How would you like this letter returned? My-Chart  Okay to leave a detailed message? Yes  [phone]

## 2021-06-10 NOTE — TELEPHONE ENCOUNTER
"I spoke with patient- she has not actually received a bill yet-however her  (who is the insurance carrier) received an email from [facility] that \"they felt the procedure was unnecessary\" and patient could get this letter to challenge the claim.     She will send the email via [employer] next week for review and can work on letter after.   "

## 2021-06-12 NOTE — PROGRESS NOTES
"  Office Visit - Follow Up   Yuliya Caro   50 y.o. female    Date of Visit: 10/22/2020    Chief Complaint   Patient presents with     Medication Management     fasting        Assessment and Plan   1. Coronary artery calcification  - rosuvastatin (CRESTOR) 10 MG tablet; Take 1 tablet (10 mg total) by mouth daily.  Dispense: 90 tablet; Refill: 3    2. Immunization due  - Varicella Zoster, Recombinant Vaccine IM  - Varicella Zoster, Recombinant Vaccine IM; Future    Return in about 1 year (around 10/22/2021) for annual physical.     History of Present Illness   This 50 y.o. old woman comes in for follow-up.  She had been having some atypical chest pain with exercise.  She had a stress test which looked okay.  CT calcium score showed elevated calcification and we started her on rosuvastatin.  She is tolerating this.  No side effects.  She is interested in increasing the dose    Review of Systems: A comprehensive review of systems was negative except as noted.     Medications, Allergies and Problem List   Reviewed, reconciled and updated  Post Discharge Medication Reconciliation Status:      Physical Exam   General Appearance:   No acute distress    /70 (Patient Site: Left Arm, Patient Position: Sitting, Cuff Size: Adult Regular)   Pulse 62   Ht 5' 5.5\" (1.664 m)   Wt 116 lb (52.6 kg)   SpO2 97%   BMI 19.01 kg/m      HEENT exam is unremarkable  Neck supple no thyromegaly or nodule palpable  Lymphatic no cervical lymphadenopathy  Cardiovascular regular rate and rhythm no murmur gallop or rub  Pulmonary lungs are clear to auscultation bilaterally  Gastrointestinal abdomen soft nontender nondistended no organomegaly  Neurologic exam is non focal  Psychiatric pleasant, no confusion or agitation        Additional Information   Current Outpatient Medications   Medication Sig Dispense Refill     nitrofurantoin, macrocrystal-monohydrate, (MACROBID) 100 MG capsule Take 1 cap by mouth after intercourse.  3     " OMEGA-3/DHA/EPA/FISH OIL (FISH OIL-OMEGA-3 FATTY ACIDS) 300-1,000 mg capsule Take 2 g by mouth daily.       rosuvastatin (CRESTOR) 10 MG tablet Take 1 tablet (10 mg total) by mouth daily. 90 tablet 3     timolol maleate (TIMOPTIC) 0.5 % ophthalmic solution INSTILL 1 DROP BY OPTHALMIC ROUTE EVERY MORNING IN BOTH EYES  3     No current facility-administered medications for this visit.      Allergies   Allergen Reactions     Sulfa (Sulfonamide Antibiotics) Rash and Hives     Social History     Tobacco Use     Smoking status: Never Smoker     Smokeless tobacco: Never Used   Substance Use Topics     Alcohol use: Never     Frequency: Never     Drug use: Never       Review and/or order of clinical lab tests:  Review and/or order of radiology tests:  Review and/or order of medicine tests:  Discussion of test results with performing physician:  Decision to obtain old records and/or obtain history from someone other than the patient:  Review and summarization of old records and/or obtaining history from someone other than the patient and.or discussion of case with another health care provider:  Independent visualization of image, tracing or specimen itself:    Time:      Ganesh Oquendo MD

## 2021-06-13 NOTE — PROGRESS NOTES
"OFFICE VISIT NOTE    Subjective:   Chief Complaint:  Sinus Problem (full, tooth pain, ST, swollen glands, ear congestion, puffy eyes X 6 days)    -year-old woman who was an issue in the past with some vertigo.  She has now been fighting a respiratory infection for the past 6 days now presents with facial pain and slight increasing dizziness.  Some sweats last night no definite fever.  She points to the area under the left eye is area of tenderness.  Does have some yellowish white drainage.  No coughing and no dyspnea.    Current Outpatient Prescriptions   Medication Sig     OMEGA-3/DHA/EPA/FISH OIL (FISH OIL-OMEGA-3 FATTY ACIDS) 300-1,000 mg capsule Take 2 g by mouth daily.     timolol maleate (TIMOPTIC) 0.5 % ophthalmic solution INSTILL 1 DROP BY OPTHALMIC ROUTE EVERY MORNING IN BOTH EYES     azithromycin (ZITHROMAX Z-PARESH) 250 MG tablet Take 2 tablets (500 mg) on  Day 1,  followed by 1 tablet (250 mg) once daily on Days 2 through 5.       Review of Systems:  A comprehensive review of systems is negative except for the comments above    Objective:    Temp 98  F (36.7  C) (Oral)   Ht 5' 5.5\" (1.664 m)  Wt 114 lb (51.7 kg)  BMI 18.68 kg/m2  GENERAL: No acute distress.  She is afebrile.  Pressure 118/74.  Throat looks normal without any significant erythema.  Ears are normal without any obvious otitis media.  No nystagmus.  Some tenderness over the left maxillary sinus.  Corpulent drainage from the left nostril.    Assessment & Plan   Yuliya Caro is a 47 y.o. female.    Probable maxillary sinusitis that she has tenderness in this region plus some drainage.  Does tolerate Z-Paresh so I will prescribe that.  Call if she is having any further symptoms.  Hopefully, she will not get bad vertigo.    Diagnoses and all orders for this visit:    Maxillary sinusitis  -     azithromycin (ZITHROMAX Z-PARESH) 250 MG tablet; Take 2 tablets (500 mg) on  Day 1,  followed by 1 tablet (250 mg) once daily on Days 2 through 5.  Dispense: " 6 tablet; Refill: 0        Tim Xie MD  Transcription using voice recognition software, may contain typographical errors.

## 2021-06-16 ENCOUNTER — TRANSFERRED RECORDS (OUTPATIENT)
Dept: HEALTH INFORMATION MANAGEMENT | Facility: CLINIC | Age: 51
End: 2021-06-16

## 2021-06-16 LAB
ALT SERPL-CCNC: 16 IU/L (ref 0–32)
AST SERPL-CCNC: 21 IU/L (ref 0–40)
CHOLESTEROL (EXTERNAL): 168 MG/DL (ref 100–199)
CREATININE (EXTERNAL): 0.76 MG/DL (ref 0.57–1)
GFR ESTIMATED (EXTERNAL): 92 ML/MIN/1.73M2
GFR ESTIMATED (IF AFRICAN AMERICAN) (EXTERNAL): 106 ML/MIN/1.73M2
GLUCOSE (EXTERNAL): 77 MG/DL (ref 65–99)
HDLC SERPL-MCNC: 66 MG/DL
LDL CHOLESTEROL (EXTERNAL): 88 MG/DL (ref 0–99)
POTASSIUM (EXTERNAL): 4.2 MMOL/L (ref 3.5–5.2)
TRIGLYCERIDES (EXTERNAL): 75 MG/DL (ref 0–149)
TSH SERPL-ACNC: 1.67 UIU/ML (ref 0.45–4.5)

## 2021-06-16 NOTE — PROGRESS NOTES
"Office Visit - Follow up    Yuliya Caro   47 y.o. female    Date of Visit: 2/19/2018    Chief Complaint   Patient presents with     Generalized Body Aches     Concerned that she has pneumonia, wet cough, wheezing, upper teeth ache, body aches, headache, runny nose, all started ~5-6 days ago       Subjective: Very pleasant active healthy 47-year-old woman here with concerns about possible pneumonia and or sinusitis.  She has noted symptoms of productive cough for the last 5-7 days.  Also has intense sinus congestion.  Cough is producing nonbloody mildly purulent phlegm in very small amounts.  Does have bifrontal and bimaxillary pressure denies ear pain.    I did a chest x-ray and reviewed it personally with the patient I did suspect some stranding in the right lung base however radiologist over read does not call this an infiltrate.        ROS: A comprehensive review of systems was performed and was otherwise negative except as mentioned above.     Exam  Lungs rhonchi both bases more prominent on the right ear nose and throat exam consistent with sinus congestion.       /68 (Patient Site: Right Arm, Patient Position: Sitting, Cuff Size: Adult Regular)  Pulse 69  Ht 5' 5.5\" (1.664 m)  Wt 107 lb (48.5 kg)  SpO2 97%  BMI 17.53 kg/m2    Assessment and Plan  Sinusitis and bronchitis possible mild pneumonia.  Treat with a azithromycin I have given her a course for 8 full days given her symptoms of sinusitis follow-up if not improved    Yuliya was seen today for generalized body aches.    Diagnoses and all orders for this visit:    Bronchitis  -     XR Chest 2 Views; Standing    Sinusitis    Other orders  -     azithromycin (ZITHROMAX Z-PARESH) 250 MG tablet; Take 2 tablets (500 mg) on  Day 1,  followed by 1 tablet (250 mg) once daily on Days 2 through 8          Time: total time spent with the patient was 25 minutes of which >50% was spent in counseling and coordination of care        Allergies   Allergen " Reactions     Sulfa (Sulfonamide Antibiotics) Rash and Hives       Medications :  Prior to Admission medications    Medication Sig Start Date End Date Taking? Authorizing Provider   OMEGA-3/DHA/EPA/FISH OIL (FISH OIL-OMEGA-3 FATTY ACIDS) 300-1,000 mg capsule Take 2 g by mouth daily.   Yes PROVIDER, HISTORICAL   timolol maleate (TIMOPTIC) 0.5 % ophthalmic solution INSTILL 1 DROP BY OPTHALMIC ROUTE EVERY MORNING IN BOTH EYES 5/18/16  Yes PROVIDER, HISTORICAL   azithromycin (ZITHROMAX Z-PARESH) 250 MG tablet Take 2 tablets (500 mg) on  Day 1,  followed by 1 tablet (250 mg) once daily on Days 2 through 8 2/19/18 2/24/18  Yohannes Tran MD        Past Medical History: No past medical history on file.    Past Surgical History:   Past Surgical History:   Procedure Laterality Date     AZ LIGATE FALLOPIAN TUBE      Description: Tubal Ligation;  Recorded: 09/03/2008;     [place] REMOVAL OF OVARIAN CYST(S)      Description: Ovarian Cystectomy;  Recorded: 09/03/2008;       Social History:   Social History     Social History     Marital status:      Spouse name: N/A     Number of children: N/A     Years of education: N/A     Occupational History     Not on file.     Social History Main Topics     Smoking status: Never Smoker     Smokeless tobacco: Never Used     Alcohol use Not on file     Drug use: Not on file     Sexual activity: Not on file     Other Topics Concern     Not on file     Social History Narrative       Family History: No family history on file.      Yohannes Tran MD

## 2021-06-18 NOTE — PROGRESS NOTES
Office Visit - Follow Up   Yuliya Caro   47 y.o. female    Date of Visit: 5/23/2018    Chief Complaint   Patient presents with     Breast Pain     left side     Leg Pain     left side        Assessment and Plan   1. Chest wall pain  This appears to be musculoskeletal.  Check labs and x-rays below and recommended ibuprofen and if not improving over the next week or so she should return to clinic for further evaluation  - D-dimer, Quantitative  - HM2(CBC w/o Differential)  - Basic Metabolic Panel  - XR Ribs Left W PA Chest; Future    2. Varicose vein of leg  Currently the vein is not inflamed but it sounds like he will occasionally develop phlebitis.  I recommended compression garments, use of heat and ibuprofen when this happens.  If above treatment is not effective I would recommend that she see a vascular specialist.  - Compression stockings    3. Superficial phlebitis  As above  - Compression stockings    4. Visit for screening mammogram    Return in about 4 weeks (around 6/20/2018) for recheck.     History of Present Illness   This 47 y.o. old woman comes in for evaluation of left-sided chest wall pain as well as pain in the left leg.  She first noticed the pain in the left leg about 2 weeks ago.  She has some superficial vein that seems to become inflamed.  This will come and go and it may be worse after exercising.  She has had no leg swelling.  Concurrently with this she has had some left-sided left chest wall pain.  It hurts when she lies on that side.  It does not hurt to take a deep breath and she said no shortness of breath.  She is quite active with bicycling and tenderness and has had no issues.  She does note that she is a little more stiff after her activities and she is to be when she was younger.  She does not recall any trauma.  She has had no skin rash.  She recently had mammograms which were normal or negative.  She has had no lumps or bumps in her breast and no discharge.    Review of  "Systems: A comprehensive review of systems was negative except as noted.     Medications, Allergies and Problem List   Reviewed and updated     Physical Exam   General Appearance:   No acute distress    BP 98/60 (Patient Site: Right Arm, Patient Position: Sitting, Cuff Size: Adult Regular)  Pulse 80  Ht 5' 5.5\" (1.664 m)  Wt 112 lb (50.8 kg)  SpO2 98%  BMI 18.35 kg/m2    She does have some prominent veins in her legs and spider veins mainly in her thighs.  There is no evidence of acute phlebitis of these superficial veins.  She has normal distal pulses neuromuscular exam is normal.     Additional Information   Current Outpatient Prescriptions   Medication Sig Dispense Refill     OMEGA-3/DHA/EPA/FISH OIL (FISH OIL-OMEGA-3 FATTY ACIDS) 300-1,000 mg capsule Take 2 g by mouth daily.       timolol maleate (TIMOPTIC) 0.5 % ophthalmic solution INSTILL 1 DROP BY OPTHALMIC ROUTE EVERY MORNING IN BOTH EYES  3     No current facility-administered medications for this visit.      Allergies   Allergen Reactions     Sulfa (Sulfonamide Antibiotics) Rash and Hives     Social History   Substance Use Topics     Smoking status: Never Smoker     Smokeless tobacco: Never Used     Alcohol use None       Review and/or order of clinical lab tests:  Review and/or order of radiology tests:  Review and/or order of medicine tests:  Discussion of test results with performing physician:  Decision to obtain old records and/or obtain history from someone other than the patient:  Review and summarization of old records and/or obtaining history from someone other than the patient and.or discussion of case with another health care provider:  Independent visualization of image, tracing or specimen itself:    Time:      Ganesh Oquendo MD  "

## 2021-06-20 NOTE — LETTER
Letter by Ganesh Oquendo MD at      Author: Ganesh Oquendo MD Service: -- Author Type: --    Filed:  Encounter Date: 8/18/2020 Status: (Other)         August 18, 2020     Patient: Yuliay Caro   YOB: 1970   Date of Visit: 8/18/2020       To Whom It May Concern:    A Nuclear Stress test was ordered for this patient with known Coronary artery disease and chest pains.  A exercise stress test lacks sufficient sensitivities for this high risk patient.    If you have any questions or concerns, please don't hesitate to call.    Sincerely,        Electronically signed by Ganesh Oquendo MD

## 2021-06-20 NOTE — LETTER
Letter by Zonia Farley RN at      Author: Zonia Farley RN Service: -- Author Type: --    Filed:  Encounter Date: 7/2/2020 Status: (Other)       7/2/2020        Yuliya Caro  [address]  [place] [address]    COVID-19 Antibody Screen   Date Value Ref Range Status   06/29/2020 Negative  Final     Comment:     No COVID-19 antibodies detected.  Patients within 10 days of symptom onset for  COVID-19 may not produce sufficient levels of detectable antibodies.  Immunocompromised COVID-19 patients may take longer to develop antibodies.     You have tested NEGATIVE for COVID-19 antibodies. This suggests you have not had or been exposed to COVID-19. But it does not mean that for sure.    The test finds antibodies in most people 10 days after they get sick. For some people, it takes longer than 10 days for antibodies to show up. Others may never show antibodies against COVID-19, especially if they have weak immune systems.    If you have COVID-19 symptoms now, please stay home and away from others.     Your current symptoms may or may not be COVID-19.     What is antibody testing?  This is a kind of blood test. We take a small sample of your blood, and then test it for something called antibodies.   Your body makes antibodies to fight infection. If your blood has antibodies for a certain germ, it means youve been infected with that germ in the past.   Sometimes, antibodies stay in your body for years after youve had the infection. They can be there even if the germ didnt make you sick. They are a sign that your body fought off the infection.  Will this test find antibodies in everyone whos had COVID-19?  No. The test finds antibodies in most people 10 days after they get sick. For some people, it takes longer than 10 days for antibodies to show up. Others may never show antibodies against COVID-19, especially if they have weak immune systems.  What are the signs of COVID-19?  Signs of COVID-19 can appear from 2 to  14 days (up to 2 weeks) after youre infected. Some people have no symptoms or only mild symptoms. Others get very sick. The most common symptoms are:      Cough    Shortness of breath or trouble breathing    Or at least 2 of these symptoms:      Fever    Chills    Repeated shaking with chills    Muscle pain    Headache    Sore throat    Losing your sense of taste or smell    You may have other symptoms. Please contact your doctor or clinic for any symptoms that worry you.    Where can I get more information?     To learn the [facility] guidelines for staying home, please visit the [place] Department of Health website at [email or web address]/coronavirus/basics.html    To learn more about COVID-19 and how to care for yourself at home, please visit the CDC website at https://www.cdc.gov/coronavirus/2019-ncov/about/steps-when-sick.html    For more options for care at [facility], please visit our website at [email or web address]    [facility] ([facility]) COVID-19 Hotline:  [phone]

## 2021-06-24 NOTE — TELEPHONE ENCOUNTER
Describe your symptoms: What sinus symptoms are you having? nasal congestion, thick dark yellow drainage, headaches, facial pain mostly in the upper teeth.   Patient states her muscle and joint aches have resolved.   Zpak 2/6/19-2/10/19 some relief but not on nasal congestion.   Patient does not want to do another course of an ABX stating last year she had many vaginal PH balance side effects long lasting.   Requesting OTC suggestions for treatment?   Any pain: yes  New/Ongoing: Ongoing  How long have you been having symptoms: 1-2  week(s)  Have you been seen for this:  Yes.  Have your symptoms changed since this visit? Yes: aches have resolved, nasal drainage has become darker yellow and thicker, no improvement on facial pressure/ pain  Triage offered?: patient declined  Home remedies tried: Mucinex   Pharmacy Name and Location: [facility]   Okay to leave a detailed message? Yes

## 2021-06-24 NOTE — TELEPHONE ENCOUNTER
She could try Flonase nasal spray and Diana pot along with analgesics such as acetaminophen or ibuprofen.

## 2021-07-06 ENCOUNTER — COMMUNICATION - HEALTHEAST (OUTPATIENT)
Dept: INTERNAL MEDICINE | Facility: CLINIC | Age: 51
End: 2021-07-06

## 2021-07-08 ENCOUNTER — COMMUNICATION - HEALTHEAST (OUTPATIENT)
Dept: INTERNAL MEDICINE | Facility: CLINIC | Age: 51
End: 2021-07-08

## 2021-07-08 NOTE — TELEPHONE ENCOUNTER
Telephone Encounter by Michell Brasher LPN at 7/8/2021  9:58 AM     Author: Michell Brasher LPN Service: -- Author Type: Licensed Nurse    Filed: 7/8/2021  9:58 AM Encounter Date: 7/8/2021 Status: Signed    : Michell Brasher LPN (Licensed Nurse)       Clinic does not have lab results.  Left detailed message for [facility] to fax over lab results for pcp review.

## 2021-07-08 NOTE — TELEPHONE ENCOUNTER
Telephone Encounter by Dena Sher at 7/8/2021  9:33 AM     Author: Dena Sher Service: -- Author Type: Patient Access    Filed: 7/8/2021  9:43 AM Encounter Date: 7/8/2021 Status: Signed    : Dena Sher (Patient Access)       Reason for Call:  Other call back      Detailed comments: Pt did extensive testing with Dr. Oquendo in 2020 to decided to put the patient on rosuvastatin (CRESTOR) 10 MG tablet. Pt had blood work done at her OBGYN ([facility]) and she noticed her liver enzymes were elevated. She does know that with statins it can cause liver decline. Pt had the OBGYN fax over labs on 7/7/21 so Dr. Oquendo can look over the external labs and decide if her medication needs to be adjusted.     Please review labs and let her know if she needs to change her medication. Pt knows this will take a couple of days since the labs have not been scanned into the chart yet.     Phone Number Patient can be reached at: Home number on file [phone] (home)    Best Time:     Can we leave a detailed message on this number?: Yes    Call taken on 7/8/2021 at 9:33 AM by Dena Sher

## 2021-07-09 NOTE — TELEPHONE ENCOUNTER
Telephone Encounter by Michell Brasher LPN at 7/9/2021 11:44 AM     Author: Michell Brasher LPN Service: -- Author Type: Licensed Nurse    Filed: 7/9/2021 11:44 AM Encounter Date: 7/8/2021 Status: Signed    : Michell Brasher LPN (Licensed Nurse)       Records located and placed in pcp inbox for review.

## 2021-07-09 NOTE — TELEPHONE ENCOUNTER
Telephone Encounter by Ganesh Oquendo MD at 7/9/2021  1:54 PM     Author: Ganesh Oquendo MD Service: -- Author Type: Physician    Filed: 7/9/2021  1:54 PM Encounter Date: 7/8/2021 Status: Signed    : Ganesh Oquendo MD (Physician)       Liver abnormality seen are a very slight elevation in proteins.  This would not be caused by rosuvastatin.  It something that we can recheck in 3 months.  I be happy to see her at that time.  If you would like to recheck it now I had be happy to sign an order for comprehensive metabolic panel.

## 2021-07-09 NOTE — TELEPHONE ENCOUNTER
Telephone Encounter by Dena Sher at 7/9/2021 11:05 AM     Author: Dena Sher Service: -- Author Type: Patient Access    Filed: 7/9/2021 11:07 AM Encounter Date: 7/8/2021 Status: Signed    : Dena Sher (Patient Access)       On 7/7/21 Marielle SUAREZ gave her fax number of [phone] to have [employer] sent records to. On 7/8/21 the fax was send to that fax number. Confirmation of a successful fax was noted by [employer].     On 7/8/21 a message went back to the patient to have the records sent to [phone].    Pt is calling very concerned that her records are being misplaced. Please call patient when the records are printed off the [phone].

## 2021-07-09 NOTE — TELEPHONE ENCOUNTER
Telephone Encounter by Michell Brasher LPN at 7/9/2021  2:18 PM     Author: Michell Brasher LPN Service: -- Author Type: Licensed Nurse    Filed: 7/9/2021  2:18 PM Encounter Date: 7/8/2021 Status: Signed    : Michell Brasher LPN (Licensed Nurse)       Spoke with pt and relayed pcp message.  Pt understanding.

## 2021-07-09 NOTE — TELEPHONE ENCOUNTER
Telephone Encounter by Michell Brasher LPN at 7/9/2021  7:39 AM     Author: Michell Brasher LPN Service: -- Author Type: Licensed Nurse    Filed: 7/9/2021  7:39 AM Encounter Date: 7/8/2021 Status: Signed    : Michell Brasher LPN (Licensed Nurse)       See [employer] messages.

## 2021-07-24 ENCOUNTER — HEALTH MAINTENANCE LETTER (OUTPATIENT)
Age: 51
End: 2021-07-24

## 2021-09-13 DIAGNOSIS — I25.10 CORONARY ARTERY CALCIFICATION: Primary | ICD-10-CM

## 2021-09-13 RX ORDER — ROSUVASTATIN CALCIUM 10 MG/1
10 TABLET, COATED ORAL DAILY
COMMUNITY
Start: 2020-10-22 | End: 2021-09-13

## 2021-09-14 RX ORDER — ROSUVASTATIN CALCIUM 10 MG/1
10 TABLET, COATED ORAL DAILY
Qty: 90 TABLET | Refills: 1 | Status: SHIPPED | OUTPATIENT
Start: 2021-09-14 | End: 2022-03-09

## 2021-09-14 NOTE — TELEPHONE ENCOUNTER
"  Disp Refills Start End JACQUE    rosuvastatin (CRESTOR) 10 MG tablet 90 tablet 3 10/22/2020  No   Sig - Route: Take 1 tablet (10 mg total) by mouth daily. - Oral   Sent to pharmacy as: rosuvastatin 10 mg tablet (CRESTOR)   E-Prescribing Status: Receipt confirmed by pharmacy (10/22/2020  9:15 AM CDT)   rosuvastatin (CRESTOR) 10 MG tablet [[id number]]    Electronically signed by: Ganesh Oquendo MD on 10/22/20 0914 Status: Active   Ordering user: Ganesh Oquendo MD 10/22/20 0914 Authorized by: Ganesh Oquendo MD   Frequency: DAILY 10/22/20 - Until Discontinued   Diagnoses  Coronary artery calcification [I25.10, I25.84]       Routing refill request to provider for review/approval because:  Labs not current:  LDL    Last Written Prescription Date:  10/22/20  Last Fill Quantity: 90,  # refills: 3   Last office visit provider:  10/22/20     Requested Prescriptions   Pending Prescriptions Disp Refills     rosuvastatin (CRESTOR) 10 MG tablet 90 tablet 1     Sig: Take 1 tablet (10 mg) by mouth daily       Statins Protocol Failed - 9/13/2021  6:17 PM        Failed - LDL on file in past 12 months     Recent Labs   Lab Test 06/12/20  0000   *             Passed - No abnormal creatine kinase in past 12 months     No lab results found.             Passed - Recent (12 mo) or future (30 days) visit within the authorizing provider's specialty     Patient has had an office visit with the authorizing provider or a provider within the authorizing providers department within the previous 12 mos or has a future within next 30 days. See \"Patient Info\" tab in inbasket, or \"Choose Columns\" in Meds & Orders section of the refill encounter.              Passed - Medication is active on med list        Passed - Patient is age 18 or older        Passed - No active pregnancy on record        Passed - No positive pregnancy test in past 12 months             Kendrick Wilder RN 09/14/21 1:18 PM  "

## 2021-09-18 ENCOUNTER — HEALTH MAINTENANCE LETTER (OUTPATIENT)
Age: 51
End: 2021-09-18

## 2022-03-10 ENCOUNTER — OFFICE VISIT (OUTPATIENT)
Dept: INTERNAL MEDICINE | Facility: CLINIC | Age: 52
End: 2022-03-10
Payer: COMMERCIAL

## 2022-03-10 VITALS
HEIGHT: 65 IN | BODY MASS INDEX: 19.58 KG/M2 | HEART RATE: 64 BPM | OXYGEN SATURATION: 100 % | SYSTOLIC BLOOD PRESSURE: 112 MMHG | DIASTOLIC BLOOD PRESSURE: 76 MMHG | WEIGHT: 117.5 LBS

## 2022-03-10 DIAGNOSIS — Z12.31 VISIT FOR SCREENING MAMMOGRAM: ICD-10-CM

## 2022-03-10 DIAGNOSIS — Z86.0100 HISTORY OF COLONIC POLYPS: ICD-10-CM

## 2022-03-10 DIAGNOSIS — E78.2 MIXED HYPERLIPIDEMIA: ICD-10-CM

## 2022-03-10 DIAGNOSIS — I83.12 VARICOSE VEINS OF LEFT LOWER EXTREMITY WITH INFLAMMATION: ICD-10-CM

## 2022-03-10 DIAGNOSIS — Z00.00 ANNUAL PHYSICAL EXAM: Primary | ICD-10-CM

## 2022-03-10 DIAGNOSIS — R42 VERTIGO: ICD-10-CM

## 2022-03-10 DIAGNOSIS — I25.10 CORONARY ARTERY CALCIFICATION: ICD-10-CM

## 2022-03-10 DIAGNOSIS — H40.1131 PRIMARY OPEN ANGLE GLAUCOMA (POAG) OF BOTH EYES, MILD STAGE: ICD-10-CM

## 2022-03-10 PROBLEM — K63.5 POLYP OF COLON: Status: ACTIVE | Noted: 2019-12-16

## 2022-03-10 PROBLEM — H40.9 GLAUCOMA: Status: ACTIVE | Noted: 2018-02-19

## 2022-03-10 LAB
ALBUMIN SERPL-MCNC: 4.6 G/DL (ref 3.5–5)
ALBUMIN UR-MCNC: NEGATIVE MG/DL
ALP SERPL-CCNC: 59 U/L (ref 45–120)
ALT SERPL W P-5'-P-CCNC: 18 U/L (ref 0–45)
ANION GAP SERPL CALCULATED.3IONS-SCNC: 10 MMOL/L (ref 5–18)
APPEARANCE UR: CLEAR
AST SERPL W P-5'-P-CCNC: 20 U/L (ref 0–40)
BILIRUB SERPL-MCNC: 0.8 MG/DL (ref 0–1)
BILIRUB UR QL STRIP: NEGATIVE
BUN SERPL-MCNC: 12 MG/DL (ref 8–22)
CALCIUM SERPL-MCNC: 9.9 MG/DL (ref 8.5–10.5)
CHLORIDE BLD-SCNC: 102 MMOL/L (ref 98–107)
CHOLEST SERPL-MCNC: 176 MG/DL
CO2 SERPL-SCNC: 27 MMOL/L (ref 22–31)
COLOR UR AUTO: YELLOW
CREAT SERPL-MCNC: 0.68 MG/DL (ref 0.6–1.1)
ERYTHROCYTE [DISTWIDTH] IN BLOOD BY AUTOMATED COUNT: 11.7 % (ref 10–15)
FASTING STATUS PATIENT QL REPORTED: YES
GFR SERPL CREATININE-BSD FRML MDRD: >90 ML/MIN/1.73M2
GLUCOSE BLD-MCNC: 83 MG/DL (ref 70–125)
GLUCOSE UR STRIP-MCNC: NEGATIVE MG/DL
HCT VFR BLD AUTO: 45.2 % (ref 35–47)
HDLC SERPL-MCNC: 64 MG/DL
HGB BLD-MCNC: 14.9 G/DL (ref 11.7–15.7)
HGB UR QL STRIP: NEGATIVE
KETONES UR STRIP-MCNC: NEGATIVE MG/DL
LDLC SERPL CALC-MCNC: 94 MG/DL
LEUKOCYTE ESTERASE UR QL STRIP: NEGATIVE
MCH RBC QN AUTO: 29.7 PG (ref 26.5–33)
MCHC RBC AUTO-ENTMCNC: 33 G/DL (ref 31.5–36.5)
MCV RBC AUTO: 90 FL (ref 78–100)
NITRATE UR QL: NEGATIVE
PH UR STRIP: 7.5 [PH] (ref 5–8)
PLATELET # BLD AUTO: 228 10E3/UL (ref 150–450)
POTASSIUM BLD-SCNC: 4.4 MMOL/L (ref 3.5–5)
PROT SERPL-MCNC: 7.8 G/DL (ref 6–8)
RBC # BLD AUTO: 5.01 10E6/UL (ref 3.8–5.2)
SODIUM SERPL-SCNC: 139 MMOL/L (ref 136–145)
SP GR UR STRIP: 1.02 (ref 1–1.03)
TRIGL SERPL-MCNC: 91 MG/DL
UROBILINOGEN UR STRIP-ACNC: 0.2 E.U./DL
WBC # BLD AUTO: 6.6 10E3/UL (ref 4–11)

## 2022-03-10 PROCEDURE — 85027 COMPLETE CBC AUTOMATED: CPT | Performed by: INTERNAL MEDICINE

## 2022-03-10 PROCEDURE — 81003 URINALYSIS AUTO W/O SCOPE: CPT | Performed by: INTERNAL MEDICINE

## 2022-03-10 PROCEDURE — 99396 PREV VISIT EST AGE 40-64: CPT | Performed by: INTERNAL MEDICINE

## 2022-03-10 PROCEDURE — 80053 COMPREHEN METABOLIC PANEL: CPT | Performed by: INTERNAL MEDICINE

## 2022-03-10 PROCEDURE — 36415 COLL VENOUS BLD VENIPUNCTURE: CPT | Performed by: INTERNAL MEDICINE

## 2022-03-10 PROCEDURE — 80061 LIPID PANEL: CPT | Performed by: INTERNAL MEDICINE

## 2022-03-10 RX ORDER — NITROFURANTOIN 25; 75 MG/1; MG/1
CAPSULE ORAL
COMMUNITY
Start: 2021-10-21

## 2022-03-10 NOTE — PROGRESS NOTES
Office Visit - Physical   Yuliya Caro   51 year old  female    Date of visit: 3/10/2022  Physician: Ganesh Oquendo MD     Assessment and Plan   1. Annual physical exam  This is a generally healthy 51-year-old woman.  Ongoing healthy lifestyle discussed and recommended    2. Visit for screening mammogram  She has this done annually and we will need to get a copy of her report from [facility]    3. History of colonic polyps  She will have a colonoscopy in the fall    4. Coronary artery calcification  It is my recommendation that she continue rosuvastatin long-term    5. Mixed hyperlipidemia  - CBC with platelets; Future  - Comprehensive metabolic panel; Future  - Lipid panel reflex to direct LDL Fasting; Future  - UA reflex to Microscopic and Culture; Future  - CBC with platelets  - Comprehensive metabolic panel  - Lipid panel reflex to direct LDL Fasting  - UA reflex to Microscopic and Culture    6. Vertigo  Stable    7. Varicose veins of left lower extremity with inflammation  Stable, discussed compression garments    8. Primary open angle glaucoma (POAG) of both eyes, mild stage  Per ophthalmology    Return in about 1 year (around 3/10/2023) for Routine preventive.     Chief Complaint   Chief Complaint   Patient presents with     Physical     fasting        Patient Profile   Social History     Social History Narrative    Lives with her , Kendrick.  Two children, Dena (2000) [facility], and Cam (2002), [place], [place] .  Actor/talent.          Past Medical History   Patient Active Problem List   Diagnosis     S/P hysterectomy     Coronary artery calcification     Primary open angle glaucoma (POAG) of both eyes, mild stage     History of colonic polyps     Varicose veins of left lower extremity with inflammation       Past Surgical History  She has a past surgical history that includes colonoscopy; Mouth surgery; GYN surgery; GYN surgery; tubal ligation; Davinci Hysterectomy Total, Salpingectomy  "Bilateral (Bilateral, 4/17/2015); REMOVAL OF OVARIAN CYST(S) (Left, 1984); LIGATE FALLOPIAN TUBE; and Laparoscopic assisted hysterectomy vaginal (2016).     History of Present Illness   This 51 year old woman comes in for annual physical.  Overall she is doing quite well.  She remains active.  She eats a healthy diet.  She is taking a statin.  Not having any chest pain or shortness of breath.  Occasional vertiginous symptoms after an episode of viral labyrinthitis years ago.  She has a bulging vein on her leg she would like me to look at.    Review of Systems: A comprehensive review of systems was negative except as noted.     Medications and Allergies   Current Outpatient Medications   Medication Sig Dispense Refill     nitroFURantoin macrocrystal-monohydrate (MACROBID) 100 MG capsule TAKE 1 CAPSULE BY MOUTH AS NEEDED FOR UTI PROPHYLAXIS.       rosuvastatin (CRESTOR) 10 MG tablet Take 1 tablet (10 mg) by mouth daily 90 tablet 1     timolol (TIMOPTIC) 0.5 % ophthalmic solution Place 1 drop into both eyes daily       Allergies   Allergen Reactions     Sulfa Drugs Hives        Family and Social History   Family History   Problem Relation Age of Onset     Parkinsonism Mother 70.00     Breast Cancer Mother      Other - See Comments Father         aging     Alcoholism Sister      Cirrhosis Sister      Irritable Bowel Syndrome Daughter      Lactose Intolerance Daughter      No Known Problems Brother      No Known Problems Son         Social History     Tobacco Use     Smoking status: Never Smoker     Smokeless tobacco: Never Used   Substance Use Topics     Alcohol use: Never     Comment: WINE OCCSIONALLY     Drug use: Never        Physical Exam   General Appearance:   No acute distress    /76 (BP Location: Left arm, Patient Position: Sitting, Cuff Size: Adult Regular)   Pulse 64   Ht 1.657 m (5' 5.25\")   Wt 53.3 kg (117 lb 8 oz)   SpO2 100%   BMI 19.40 kg/m      EYES: Eyelids, conjunctiva, and sclera were " normal. Pupils were normal. Cornea, iris, and lens were normal bilaterally.  HEAD, EARS, NOSE, MOUTH, AND THROAT: Head and face were normal. Hearing was normal to voice and the ears were normal to external exam.   NECK: Neck appearance was normal. There were no neck masses and the thyroid was not enlarged.  RESPIRATORY: Breathing pattern was normal and the chest moved symmetrically.  Percussion/auscultatory percussion was normal.  Lung sounds were normal and there were no abnormal sounds.  CARDIOVASCULAR: Heart rate and rhythm were normal.  S1 and S2 were normal and there were no extra sounds or murmurs. Peripheral pulses in arms and legs were normal.  Jugular venous pressure was normal.  There was no peripheral edema.  Small varicose vein anterior shin left leg  GASTROINTESTINAL: The abdomen was normal in contour.  Bowel sounds were present.  Percussion detected no organ enlargement or tenderness.  Palpation detected no tenderness, mass, or enlarged organs.   MUSCULOSKELETAL: Skeletal configuration was normal and muscle mass was normal for age. Joint appearance was overall normal.  LYMPHATIC: There were no enlarged nodes.  SKIN/HAIR/NAILS: Skin color was normal.  There were no skin lesions.  Hair and nails were normal.  NEUROLOGIC: The patient was alert and oriented to person, place, time, and circumstance. Speech was normal. Cranial nerves were normal. Motor strength was normal for age. The patient was normally coordinated.  PSYCHIATRIC:  Mood and affect were normal and the patient had normal recent and remote memory. The patient's judgment and insight were normal.       Additional Information        Ganesh Oquendo MD  Internal Medicine  Contact me at [phone]  Answers for HPI/ROS submitted by the patient on 3/3/2022  Frequency of exercise:: 4-5 days/week  Getting at least 3 servings of Calcium per day:: Yes  Diet:: Regular (no restrictions), Breakfast skipped  Taking medications regularly:: Yes  Medication  side effects:: None  Bi-annual eye exam:: Yes  Dental care twice a year:: Yes  Sleep apnea or symptoms of sleep apnea:: None  Additional concerns today:: No  Duration of exercise:: 45-60 minutes

## 2022-03-16 NOTE — PROGRESS NOTES
Contacted [facility] - they require a release of information. Contacted patient - she will complete SOLOMON and have [facility] fax mammo report.

## 2022-04-12 ENCOUNTER — MYC MEDICAL ADVICE (OUTPATIENT)
Dept: INTERNAL MEDICINE | Facility: CLINIC | Age: 52
End: 2022-04-12
Payer: COMMERCIAL

## 2022-06-20 ENCOUNTER — TRANSFERRED RECORDS (OUTPATIENT)
Dept: HEALTH INFORMATION MANAGEMENT | Facility: CLINIC | Age: 52
End: 2022-06-20

## 2022-08-17 ENCOUNTER — OFFICE VISIT (OUTPATIENT)
Dept: FAMILY MEDICINE | Facility: CLINIC | Age: 52
End: 2022-08-17
Payer: COMMERCIAL

## 2022-08-17 VITALS
OXYGEN SATURATION: 98 % | BODY MASS INDEX: 19.43 KG/M2 | WEIGHT: 116.6 LBS | RESPIRATION RATE: 14 BRPM | HEART RATE: 58 BPM | SYSTOLIC BLOOD PRESSURE: 112 MMHG | DIASTOLIC BLOOD PRESSURE: 68 MMHG | TEMPERATURE: 97.5 F | HEIGHT: 65 IN

## 2022-08-17 DIAGNOSIS — Z23 NEED FOR VACCINATION: Primary | ICD-10-CM

## 2022-08-17 DIAGNOSIS — H81.10 BENIGN PAROXYSMAL POSITIONAL VERTIGO, UNSPECIFIED LATERALITY: ICD-10-CM
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
## 2022-08-17 PROCEDURE — 86618 LYME DISEASE ANTIBODY: CPT | Performed by: FAMILY MEDICINE

## 2022-08-17 PROCEDURE — 90670 PCV13 VACCINE IM: CPT | Performed by: FAMILY MEDICINE

## 2022-08-17 PROCEDURE — 99213 OFFICE O/P EST LOW 20 MIN: CPT | Mod: 25 | Performed by: FAMILY MEDICINE

## 2022-08-17 PROCEDURE — 36415 COLL VENOUS BLD VENIPUNCTURE: CPT | Performed by: FAMILY MEDICINE

## 2022-08-17 PROCEDURE — 90471 IMMUNIZATION ADMIN: CPT | Performed by: FAMILY MEDICINE

## 2022-08-17 ASSESSMENT — PAIN SCALES - GENERAL: PAINLEVEL: NO PAIN (1)

## 2022-08-17 NOTE — PROGRESS NOTES
Assessment & Plan       (W57.XXXS) Bug bite with infection, sequela  Comment: possible tick   Plan: Lyme Disease Total Abs Bld with Reflex to         Confirm CLIA, amoxicillin-clavulanate         (AUGMENTIN) 875-125 MG tablet        Tick vs other bug bite- infected   Discussed case and concerns with Lyme's.  Check labs.  Start with Augmentin which should cover for Lyme's regardless, to cover for infection .   Cares and symptomatic treatment discussed follow up if problem . Talked about warning s/s for which should be seen        (H81.10) Benign paroxysmal positional vertigo, unspecified laterality  Comment: mild sx   Plan: stable  Patient has known history of vertigo currently comfortable just trying her exercises at home declined any meclizine, ..  Is comfortable watching she will follow-up if any problem.     (Z23) Need for vaccination  (primary encounter diagnosis)  Comment: Patient to proceed with a booster.   Plan: Pneumococcal vaccine 13 valent PCV13 IM         (Prevnar)              Check labs.script  sent.Cares and  treatment discussed follow. up if problem   Patient expressed understanding and agreement with treatment plan. All patient's questions were answered, will let me know if has more later.  Medications: Rx's: Reviewed the potential side effects/complications of medications prescribed.       Emma Acosta MD  [facility] Dywer is a 52 year old, presenting for the following health issues:  No chief complaint on file.      History of Present Illness       Reason for visit:  Bug bite  Symptom onset:  3-7 days ago  Symptoms include:  Pain and swelling  Symptom intensity:  Mild  Symptom progression:  Improving  Had these symptoms before:  No  What makes it worse:  No  What makes it better:  No    She eats 4 or more servings of fruits and vegetables daily.She consumes 0 sweetened beverage(s) daily.She exercises with enough effort to increase her heart rate  60 or more minutes per day.  She exercises with enough effort to increase her heart rate 5 days per week.   She is taking medications regularly.   noted after she was out on deck cleaning spider webs etc   unsure if it was tick or may be spider bite, but noted red spot and red ring around it on rt  abdominal wall that day, so got worried .  Eventually became more harder and more red and got worse ,  she tried lancing wit sterile lancet nothing came out except maybe slight my slight oozing drainage   It is bit better but just concerned and wanted to get check still mostly worried about possible tick bite.lyme's concerns.     Denies an fever or chills myalgias , arthralgias, and overall feeling okay.     Has hx of ear infection / fluid behind ear  drum etc and also hx of vertigo at that time a while ago.  She said significant improvement of the vertigo few years ago although she went through physical therapy and some, exercises for dizzy balance clinic that had worked well for her.   She reported some recurrence of the symptoms few days ago, mostly mild vertigo with no associated nausea vomiting visual changes.  She tried her exercise and that helped.  She just wanted to get her ears checked to make sure she is otherwise okay    Her vertigo is mild and has physical therapy  with [facility] while ago and she learned to  manage her vertigo with exercises quite well.   .  Currently has mild  Sx  Of vertigo, just off balance feeling with head movements, sometimes multiple laying down.  No extremity chest pain palpitation shortness of breath.  no nausea, vomiting or double vision etc,  although bright light can trigger vertigo     She also wants to get her pneumonia booster, because of previous history of pneumonia.  Currently feeling well.           Review of Systems   Constitutional, HEENT, cardiovascular, pulmonary, GI, , musculoskeletal, neuro, skin, endocrine and psych systems are negative, except as  otherwise noted.      Objective    There were no vitals taken for this visit.  There is no height or weight on file to calculate BMI.  Physical Exam   GENERAL: healthy, alert and no distress  EYES: Eyes grossly normal to inspection  HENT: normal cephalic/atraumatic, ear canals and TM's normal, nose and mouth without ulcers or lesions, oropharynx clear and oral mucous membranes moist  NECK: no adenopathy, no asymmetry, masses, or scars and thyroid normal to palpation  RESP: lungs clear to auscultation - no rales, rhonchi or wheezes  CV: regular rate and rhythm, normal S1 S2,   click or rub, no peripheral edema   ABDOMEN: soft, nontender, no hepatosplenomegaly, no masses and bowel sounds normal  MS: no edema  SKIN: Right side of mid  abdominal wall -  has small central erythematous 2 to 3 mm size lesion that has slight scab on it .  There is a faint erythematous ring approximatelly and inch size ,  around it with the clearing in the center and possibly target like  lesion although quiet faint at this time .  No acute tenderness   NEURO: Normal strength and tone, mentation intact and speech normal  PSYCH: mentation appears normal, affect normal/bright

## 2022-08-18 LAB — B BURGDOR IGG+IGM SER QL: 0.13

## 2022-08-27 ENCOUNTER — MYC MEDICAL ADVICE (OUTPATIENT)
Dept: INTERNAL MEDICINE | Facility: CLINIC | Age: 52
End: 2022-08-27

## 2022-08-27 DIAGNOSIS — E78.2 MIXED HYPERLIPIDEMIA: Primary | ICD-10-CM

## 2022-08-30 RX ORDER — ROSUVASTATIN CALCIUM 10 MG/1
10 TABLET, COATED ORAL DAILY
Qty: 90 TABLET | Refills: 3 | Status: SHIPPED | OUTPATIENT
Start: 2022-08-30 | End: 2023-05-18

## 2022-08-30 NOTE — TELEPHONE ENCOUNTER
Chief Complaint   Patient presents with     Refill Request     Crestor 10 mg. Routing to erx pool.  Sue Jasso RN on 8/30/2022 at 9:57 AM

## 2022-09-08 ENCOUNTER — TRANSFERRED RECORDS (OUTPATIENT)
Dept: HEALTH INFORMATION MANAGEMENT | Facility: CLINIC | Age: 52
End: 2022-09-08

## 2022-09-15 ENCOUNTER — ALLIED HEALTH/NURSE VISIT (OUTPATIENT)
Dept: FAMILY MEDICINE | Facility: CLINIC | Age: 52
End: 2022-09-15
Payer: COMMERCIAL

## 2022-09-15 DIAGNOSIS — Z23 NEED FOR VACCINATION: Primary | ICD-10-CM

## 2022-09-15 PROCEDURE — 99207 PR NO CHARGE NURSE ONLY: CPT

## 2022-09-15 PROCEDURE — 0134A COVID-19,PF,MODERNA BIVALENT: CPT

## 2022-09-15 PROCEDURE — 91313 COVID-19,PF,MODERNA BIVALENT: CPT

## 2022-09-19 ENCOUNTER — TRANSFERRED RECORDS (OUTPATIENT)
Dept: HEALTH INFORMATION MANAGEMENT | Facility: CLINIC | Age: 52
End: 2022-09-19

## 2022-11-20 ENCOUNTER — HEALTH MAINTENANCE LETTER (OUTPATIENT)
Age: 52
End: 2022-11-20

## 2023-04-15 ENCOUNTER — HEALTH MAINTENANCE LETTER (OUTPATIENT)
Age: 53
End: 2023-04-15

## 2023-05-17 DIAGNOSIS — E78.2 MIXED HYPERLIPIDEMIA: ICD-10-CM

## 2023-05-18 RX ORDER — ROSUVASTATIN CALCIUM 10 MG/1
TABLET, COATED ORAL
Qty: 90 TABLET | Refills: 3 | Status: SHIPPED | OUTPATIENT
Start: 2023-05-18 | End: 2024-05-21

## 2023-05-18 NOTE — TELEPHONE ENCOUNTER
"Routing refill request to provider for review/approval because:  Labs not current:  ldl  Patient needs to be seen because it has been more than 1 year since last office visit.    Last Written Prescription Date:  8/30/22  Last Fill Quantity: 90,  # refills: 3   Last office visit provider:  3/10/22     Requested Prescriptions   Pending Prescriptions Disp Refills     rosuvastatin (CRESTOR) 10 MG tablet [Pharmacy Med Name: ROSUVASTATIN 10MG TABLETS] 90 tablet 3     Sig: TAKE 1 TABLET(10 MG) BY MOUTH DAILY       Statins Protocol Failed - 5/17/2023  8:07 AM        Failed - LDL on file in past 12 months     Recent Labs   Lab Test 03/10/22  1257   LDL 94             Failed - Recent (12 mo) or future (30 days) visit within the authorizing provider's specialty     Patient has had an office visit with the authorizing provider or a provider within the authorizing providers department within the previous 12 mos or has a future within next 30 days. See \"Patient Info\" tab in inbasket, or \"Choose Columns\" in Meds & Orders section of the refill encounter.              Passed - No abnormal creatine kinase in past 12 months     No lab results found.             Passed - Medication is active on med list        Passed - Patient is age 18 or older        Passed - No active pregnancy on record        Passed - No positive pregnancy test in past 12 months             Bere Trinidad RN 05/17/23 9:23 PM  "

## 2023-06-20 ASSESSMENT — ENCOUNTER SYMPTOMS
HEARTBURN: 0
PALPITATIONS: 0
FREQUENCY: 0
DIZZINESS: 0
HEMATOCHEZIA: 0
NAUSEA: 0
SORE THROAT: 0
COUGH: 0
SHORTNESS OF BREATH: 0
CHILLS: 0
CONSTIPATION: 0
NERVOUS/ANXIOUS: 1
DYSURIA: 0
HEADACHES: 0
FEVER: 0
DIARRHEA: 0
BREAST MASS: 0
PARESTHESIAS: 0
MYALGIAS: 0
ARTHRALGIAS: 0
ABDOMINAL PAIN: 0
HEMATURIA: 0
EYE PAIN: 0
JOINT SWELLING: 0
WEAKNESS: 0

## 2023-06-22 ENCOUNTER — TRANSFERRED RECORDS (OUTPATIENT)
Dept: HEALTH INFORMATION MANAGEMENT | Facility: CLINIC | Age: 53
End: 2023-06-22
Payer: COMMERCIAL

## 2023-06-27 ENCOUNTER — OFFICE VISIT (OUTPATIENT)
Dept: INTERNAL MEDICINE | Facility: CLINIC | Age: 53
End: 2023-06-27
Payer: COMMERCIAL

## 2023-06-27 VITALS
WEIGHT: 110.2 LBS | HEIGHT: 66 IN | RESPIRATION RATE: 18 BRPM | SYSTOLIC BLOOD PRESSURE: 108 MMHG | BODY MASS INDEX: 17.71 KG/M2 | HEART RATE: 57 BPM | DIASTOLIC BLOOD PRESSURE: 76 MMHG | TEMPERATURE: 98.2 F | OXYGEN SATURATION: 98 %

## 2023-06-27 DIAGNOSIS — Z00.00 ANNUAL PHYSICAL EXAM: Primary | ICD-10-CM

## 2023-06-27 DIAGNOSIS — B96.89 BACTERIAL VAGINOSIS: ICD-10-CM

## 2023-06-27 DIAGNOSIS — Z12.31 VISIT FOR SCREENING MAMMOGRAM: ICD-10-CM

## 2023-06-27 DIAGNOSIS — Z13.1 SCREENING FOR DIABETES MELLITUS: ICD-10-CM

## 2023-06-27 DIAGNOSIS — I83.12 VARICOSE VEINS OF LEFT LOWER EXTREMITY WITH INFLAMMATION: ICD-10-CM

## 2023-06-27 DIAGNOSIS — I25.10 CORONARY ARTERY CALCIFICATION: ICD-10-CM

## 2023-06-27 DIAGNOSIS — Z86.0100 HISTORY OF COLONIC POLYPS: ICD-10-CM

## 2023-06-27 DIAGNOSIS — H40.1131 PRIMARY OPEN ANGLE GLAUCOMA (POAG) OF BOTH EYES, MILD STAGE: ICD-10-CM

## 2023-06-27 DIAGNOSIS — F43.25 ADJUSTMENT DISORDER WITH MIXED DISTURBANCE OF EMOTIONS AND CONDUCT: ICD-10-CM

## 2023-06-27 DIAGNOSIS — N76.0 BACTERIAL VAGINOSIS: ICD-10-CM

## 2023-06-27 PROBLEM — H81.10 BENIGN PAROXYSMAL POSITIONAL VERTIGO, UNSPECIFIED LATERALITY: Status: RESOLVED | Noted: 2022-08-17 | Resolved: 2023-06-27

## 2023-06-27 LAB
ALBUMIN SERPL BCG-MCNC: 4.9 G/DL (ref 3.5–5.2)
ALBUMIN UR-MCNC: NEGATIVE MG/DL
ALP SERPL-CCNC: 51 U/L (ref 35–104)
ALT SERPL W P-5'-P-CCNC: 23 U/L (ref 0–50)
ANION GAP SERPL CALCULATED.3IONS-SCNC: 14 MMOL/L (ref 7–15)
APPEARANCE UR: CLEAR
AST SERPL W P-5'-P-CCNC: 28 U/L (ref 0–45)
BILIRUB SERPL-MCNC: 0.4 MG/DL
BILIRUB UR QL STRIP: NEGATIVE
BUN SERPL-MCNC: 17.5 MG/DL (ref 6–20)
CALCIUM SERPL-MCNC: 9.3 MG/DL (ref 8.6–10)
CHLORIDE SERPL-SCNC: 102 MMOL/L (ref 98–107)
CHOLEST SERPL-MCNC: 166 MG/DL
COLOR UR AUTO: YELLOW
CREAT SERPL-MCNC: 0.66 MG/DL (ref 0.51–0.95)
DEPRECATED HCO3 PLAS-SCNC: 24 MMOL/L (ref 22–29)
ERYTHROCYTE [DISTWIDTH] IN BLOOD BY AUTOMATED COUNT: 12 % (ref 10–15)
GFR SERPL CREATININE-BSD FRML MDRD: >90 ML/MIN/1.73M2
GLUCOSE SERPL-MCNC: 89 MG/DL (ref 70–99)
GLUCOSE UR STRIP-MCNC: NEGATIVE MG/DL
HBA1C MFR BLD: 5 % (ref 0–5.6)
HCT VFR BLD AUTO: 43.1 % (ref 35–47)
HDLC SERPL-MCNC: 68 MG/DL
HGB BLD-MCNC: 14.3 G/DL (ref 11.7–15.7)
HGB UR QL STRIP: NEGATIVE
KETONES UR STRIP-MCNC: NEGATIVE MG/DL
LDLC SERPL CALC-MCNC: 82 MG/DL
LEUKOCYTE ESTERASE UR QL STRIP: NEGATIVE
MCH RBC QN AUTO: 30.7 PG (ref 26.5–33)
MCHC RBC AUTO-ENTMCNC: 33.2 G/DL (ref 31.5–36.5)
MCV RBC AUTO: 93 FL (ref 78–100)
NITRATE UR QL: NEGATIVE
NONHDLC SERPL-MCNC: 98 MG/DL
PH UR STRIP: 6 [PH] (ref 5–8)
PLATELET # BLD AUTO: 206 10E3/UL (ref 150–450)
POTASSIUM SERPL-SCNC: 3.8 MMOL/L (ref 3.4–5.3)
PROT SERPL-MCNC: 7.5 G/DL (ref 6.4–8.3)
RBC # BLD AUTO: 4.66 10E6/UL (ref 3.8–5.2)
SODIUM SERPL-SCNC: 140 MMOL/L (ref 136–145)
SP GR UR STRIP: <=1.005 (ref 1–1.03)
TRIGL SERPL-MCNC: 80 MG/DL
TSH SERPL DL<=0.005 MIU/L-ACNC: 2.91 UIU/ML (ref 0.3–4.2)
UROBILINOGEN UR STRIP-ACNC: 0.2 E.U./DL
WBC # BLD AUTO: 5.8 10E3/UL (ref 4–11)

## 2023-06-27 PROCEDURE — 80061 LIPID PANEL: CPT | Performed by: INTERNAL MEDICINE

## 2023-06-27 PROCEDURE — 99214 OFFICE O/P EST MOD 30 MIN: CPT | Mod: 25 | Performed by: INTERNAL MEDICINE

## 2023-06-27 PROCEDURE — 81003 URINALYSIS AUTO W/O SCOPE: CPT | Performed by: INTERNAL MEDICINE

## 2023-06-27 PROCEDURE — 80053 COMPREHEN METABOLIC PANEL: CPT | Performed by: INTERNAL MEDICINE

## 2023-06-27 PROCEDURE — 36415 COLL VENOUS BLD VENIPUNCTURE: CPT | Performed by: INTERNAL MEDICINE

## 2023-06-27 PROCEDURE — 99396 PREV VISIT EST AGE 40-64: CPT | Performed by: INTERNAL MEDICINE

## 2023-06-27 PROCEDURE — 83036 HEMOGLOBIN GLYCOSYLATED A1C: CPT | Performed by: INTERNAL MEDICINE

## 2023-06-27 PROCEDURE — 84443 ASSAY THYROID STIM HORMONE: CPT | Performed by: INTERNAL MEDICINE

## 2023-06-27 PROCEDURE — 85027 COMPLETE CBC AUTOMATED: CPT | Performed by: INTERNAL MEDICINE

## 2023-06-27 NOTE — PROGRESS NOTES
Office Visit - Physical   Yuliya Caro   52 year old  female    Date of visit: 6/27/2023  Physician: Ganesh Oquendo MD     Assessment and Plan   1. Annual physical exam  This is a 52-year-old woman with issues as discussed below    2. Visit for screening mammogram  Recently had a mammogram at [facility] and will try and obtain this record    3. Screening for diabetes mellitus  - Hemoglobin A1c; Future  - Hemoglobin A1c    4. History of colonic polyps  She is up-to-date on her colonoscopy with a 5-year follow-up recommended    5. Coronary artery calcification  Continue with rosuvastatin  - CBC with platelets; Future  - Comprehensive metabolic panel; Future  - Lipid panel reflex to direct LDL Fasting; Future  - TSH with free T4 reflex; Future  - UA Macroscopic with reflex to Microscopic and Culture; Future  - CBC with platelets  - Comprehensive metabolic panel  - Lipid panel reflex to direct LDL Fasting  - TSH with free T4 reflex  - UA Macroscopic with reflex to Microscopic and Culture    6. Primary open angle glaucoma (POAG) of both eyes, mild stage  Continue with drops per ophthalmology    7. Varicose veins of left lower extremity with inflammation  Stable    8. Bacterial vaginosis  Plan per gynecology    9. Adjustment disorder with mixed disturbance of emotions and conduct  Discussed at length, will continue management through exercise and contemplation.  He is not interested in any cognitive behavioral therapy or medication etc.    Return in about 1 year (around 6/27/2024) for Routine preventive.     Chief Complaint   Chief Complaint   Patient presents with     Recheck Medication     Physical        Patient Profile   Social History     Social History Narrative    Lives with her , Kendrick.  Two children, Dena (2000) [facility], , and Cam (2002), [place], [place] .  Actor/talent.          Past Medical History   Patient Active Problem List   Diagnosis     S/P  hysterectomy     Coronary artery calcification     Primary open angle glaucoma (POAG) of both eyes, mild stage     History of colonic polyps     Varicose veins of left lower extremity with inflammation       Past Surgical History  She has a past surgical history that includes colonoscopy; Mouth surgery; GYN surgery; GYN surgery; tubal ligation; Davinci Hysterectomy Total, Salpingectomy Bilateral (Bilateral, 4/17/2015); REMOVAL OF OVARIAN CYST(S) (Left, 1984); LIGATE FALLOPIAN TUBE; and Laparoscopic assisted hysterectomy vaginal (2016).     History of Present Illness   This 52 year old woman comes in for annual physical.  Overall she is doing okay.  Recently saw her gynecologist.  She was diagnosed with bacterial vaginosis and use her night is all gel.  This has been a bit recurrent and she thinks it might be related to her exercise and biking shorts etc.  She has been doing some boric acid cleanses and will continue with this periodically.  This was recommended by her gynecologist.  She has had some stress in her life.  Her son was quite ill with a mononucleosis syndrome.  Her daughter has had some anxiety and is now living in [place].  There has been some stress in her marriage.  She manages this through exercise.  She has glaucoma and uses drops.  Some varicose veins which are stable.  Known coronary artery calcification for which she is on a statin.  She has lost some weight with invisible line product for teeth straightening    Review of Systems: A comprehensive review of systems was negative except as noted.     Medications and Allergies   Current Outpatient Medications   Medication Sig Dispense Refill     nitroFURantoin macrocrystal-monohydrate (MACROBID) 100 MG capsule TAKE 1 CAPSULE BY MOUTH AS NEEDED FOR UTI PROPHYLAXIS.       rosuvastatin (CRESTOR) 10 MG tablet TAKE 1 TABLET(10 MG) BY MOUTH DAILY 90 tablet 3     timolol (TIMOPTIC) 0.5 % ophthalmic solution Place 1 drop into both eyes daily       Allergies  "  Allergen Reactions     Sulfa Antibiotics Hives        Family and Social History   Family History   Problem Relation Age of Onset     Parkinsonism Mother 70     Breast Cancer Mother      Other - See Comments Father         aging     Parkinsonism Father      Alcoholism Sister      Cirrhosis Sister      No Known Problems Brother      Irritable Bowel Syndrome Daughter      Lactose Intolerance Daughter      No Known Problems Son         Social History     Tobacco Use     Smoking status: Never     Smokeless tobacco: Never   Vaping Use     Vaping Use: Never used   Substance Use Topics     Alcohol use: Never     Comment: WINE OCCSIONALLY     Drug use: Never        Physical Exam   General Appearance:   No acute distress    /76 (BP Location: Left arm, Patient Position: Sitting, Cuff Size: Adult Small)   Pulse 57   Temp 98.2  F (36.8  C) (Tympanic)   Resp 18   Ht 1.67 m (5' 5.75\")   Wt 50 kg (110 lb 3.2 oz)   LMP  (LMP Unknown)   SpO2 98%   Breastfeeding No   BMI 17.92 kg/m      EYES: Eyelids, conjunctiva, and sclera were normal. Pupils were normal. Cornea, iris, and lens were normal bilaterally.  HEAD, EARS, NOSE, MOUTH, AND THROAT: Head and face were normal. Hearing was normal to voice and the ears were normal to external exam. Nose appearance was normal and there was no discharge. Oropharynx was normal.  NECK: Neck appearance was normal. There were no neck masses and the thyroid was not enlarged.  RESPIRATORY: Breathing pattern was normal and the chest moved symmetrically.  Percussion/auscultatory percussion was normal.  Lung sounds were normal and there were no abnormal sounds.  CARDIOVASCULAR: Heart rate and rhythm were normal.  S1 and S2 were normal and there were no extra sounds or murmurs. Peripheral pulses in arms and legs were normal.  Jugular venous pressure was normal.  There was no peripheral edema, but varicose veins are present without inflammation  GASTROINTESTINAL: The abdomen was normal in " contour.    MUSCULOSKELETAL: Skeletal configuration was normal and muscle mass was normal for age. Joint appearance was overall normal.  NEUROLOGIC: The patient was alert and oriented to person, place, time, and circumstance. Speech was normal. Cranial nerves were normal. Motor strength was normal for age. The patient was normally coordinated.  PSYCHIATRIC:  Mood and affect were normal and the patient had normal recent and remote memory. The patient's judgment and insight were normal.       Additional Information        Ganesh Oquendo MD  Internal Medicine  Contact me at [phone]  Answers for HPI/ROS submitted by the patient on 6/20/2023  Frequency of exercise:: 4-5 days/week  Getting at least 3 servings of Calcium per day:: Yes  Diet:: Breakfast skipped  Taking medications regularly:: Yes  Medication side effects:: Not applicable  Bi-annual eye exam:: Yes  Dental care twice a year:: Yes  Sleep apnea or symptoms of sleep apnea:: None  abdominal pain: No  Blood in stool: No  Blood in urine: No  chest pain: No  chills: No  congestion: No  constipation: No  cough: No  diarrhea: No  dizziness: No  ear pain: No  eye pain: No  nervous/anxious: Yes  fever: No  frequency: No  genital sores: No  headaches: No  hearing loss: No  heartburn: No  arthralgias: No  joint swelling: No  peripheral edema: No  mood changes: No  myalgias: No  nausea: No  dysuria: No  palpitations: No  Skin sensation changes: No  sore throat: No  urgency: No  rash: No  shortness of breath: No  visual disturbance: No  weakness: No  pelvic pain: No  vaginal bleeding: No  vaginal discharge: No  tenderness: No  breast mass: No  breast discharge: No  Additional concerns today:: No  Duration of exercise:: Greater than 60 minutes

## 2023-11-30 ENCOUNTER — PATIENT OUTREACH (OUTPATIENT)
Dept: GASTROENTEROLOGY | Facility: CLINIC | Age: 53
End: 2023-11-30
Payer: COMMERCIAL

## 2023-12-29 ENCOUNTER — E-VISIT (OUTPATIENT)
Dept: INTERNAL MEDICINE | Facility: CLINIC | Age: 53
End: 2023-12-29
Payer: COMMERCIAL

## 2023-12-29 DIAGNOSIS — J01.01 ACUTE RECURRENT MAXILLARY SINUSITIS: Primary | ICD-10-CM

## 2023-12-29 PROCEDURE — 99421 OL DIG E/M SVC 5-10 MIN: CPT | Performed by: INTERNAL MEDICINE

## 2023-12-29 RX ORDER — AZITHROMYCIN 500 MG/1
500 TABLET, FILM COATED ORAL DAILY
Qty: 7 TABLET | Refills: 0 | Status: SHIPPED | OUTPATIENT
Start: 2023-12-29 | End: 2024-01-05

## 2023-12-29 RX ORDER — PREDNISONE 20 MG/1
20 TABLET ORAL EVERY MORNING
Qty: 4 TABLET | Refills: 0 | Status: SHIPPED | OUTPATIENT
Start: 2023-12-29 | End: 2024-01-02

## 2023-12-29 RX ORDER — GUAIFENESIN 600 MG/1
600 TABLET, EXTENDED RELEASE ORAL 2 TIMES DAILY
Qty: 60 TABLET | Refills: 2 | Status: SHIPPED | OUTPATIENT
Start: 2023-12-29 | End: 2024-06-27

## 2024-05-21 DIAGNOSIS — E78.2 MIXED HYPERLIPIDEMIA: ICD-10-CM

## 2024-05-21 RX ORDER — ROSUVASTATIN CALCIUM 10 MG/1
10 TABLET, COATED ORAL DAILY
Qty: 90 TABLET | Refills: 1 | Status: SHIPPED | OUTPATIENT
Start: 2024-05-21 | End: 2024-06-27

## 2024-06-22 SDOH — HEALTH STABILITY: PHYSICAL HEALTH: ON AVERAGE, HOW MANY DAYS PER WEEK DO YOU ENGAGE IN MODERATE TO STRENUOUS EXERCISE (LIKE A BRISK WALK)?: 6 DAYS

## 2024-06-22 SDOH — HEALTH STABILITY: PHYSICAL HEALTH: ON AVERAGE, HOW MANY MINUTES DO YOU ENGAGE IN EXERCISE AT THIS LEVEL?: 60 MIN

## 2024-06-22 ASSESSMENT — SOCIAL DETERMINANTS OF HEALTH (SDOH): HOW OFTEN DO YOU GET TOGETHER WITH FRIENDS OR RELATIVES?: TWICE A WEEK

## 2024-06-27 ENCOUNTER — OFFICE VISIT (OUTPATIENT)
Dept: INTERNAL MEDICINE | Facility: CLINIC | Age: 54
End: 2024-06-27
Payer: COMMERCIAL

## 2024-06-27 VITALS
SYSTOLIC BLOOD PRESSURE: 102 MMHG | RESPIRATION RATE: 13 BRPM | TEMPERATURE: 96.6 F | BODY MASS INDEX: 18.34 KG/M2 | HEIGHT: 66 IN | HEART RATE: 67 BPM | OXYGEN SATURATION: 99 % | DIASTOLIC BLOOD PRESSURE: 72 MMHG | WEIGHT: 114.1 LBS

## 2024-06-27 DIAGNOSIS — I83.12 VARICOSE VEINS OF LEFT LOWER EXTREMITY WITH INFLAMMATION: ICD-10-CM

## 2024-06-27 DIAGNOSIS — B96.89 BACTERIAL VAGINOSIS: ICD-10-CM

## 2024-06-27 DIAGNOSIS — E78.2 MIXED HYPERLIPIDEMIA: ICD-10-CM

## 2024-06-27 DIAGNOSIS — H40.1131 PRIMARY OPEN ANGLE GLAUCOMA (POAG) OF BOTH EYES, MILD STAGE: ICD-10-CM

## 2024-06-27 DIAGNOSIS — Z86.0100 HISTORY OF COLONIC POLYPS: ICD-10-CM

## 2024-06-27 DIAGNOSIS — Z00.00 ANNUAL PHYSICAL EXAM: Primary | ICD-10-CM

## 2024-06-27 DIAGNOSIS — N76.0 BACTERIAL VAGINOSIS: ICD-10-CM

## 2024-06-27 DIAGNOSIS — Z90.710 S/P HYSTERECTOMY: ICD-10-CM

## 2024-06-27 DIAGNOSIS — I25.10 CORONARY ARTERY CALCIFICATION: ICD-10-CM

## 2024-06-27 DIAGNOSIS — B37.31 YEAST INFECTION OF THE VAGINA: ICD-10-CM

## 2024-06-27 LAB
ALBUMIN SERPL BCG-MCNC: 4.7 G/DL (ref 3.5–5.2)
ALP SERPL-CCNC: 48 U/L (ref 40–150)
ALT SERPL W P-5'-P-CCNC: 22 U/L (ref 0–50)
ANION GAP SERPL CALCULATED.3IONS-SCNC: 6 MMOL/L (ref 7–15)
AST SERPL W P-5'-P-CCNC: 28 U/L (ref 0–45)
BILIRUB SERPL-MCNC: 0.6 MG/DL
BUN SERPL-MCNC: 16.3 MG/DL (ref 6–20)
CALCIUM SERPL-MCNC: 9.3 MG/DL (ref 8.6–10)
CHLORIDE SERPL-SCNC: 104 MMOL/L (ref 98–107)
CHOLEST SERPL-MCNC: 163 MG/DL
CREAT SERPL-MCNC: 0.72 MG/DL (ref 0.51–0.95)
DEPRECATED HCO3 PLAS-SCNC: 28 MMOL/L (ref 22–29)
EGFRCR SERPLBLD CKD-EPI 2021: >90 ML/MIN/1.73M2
ERYTHROCYTE [DISTWIDTH] IN BLOOD BY AUTOMATED COUNT: 11.9 % (ref 10–15)
FASTING STATUS PATIENT QL REPORTED: YES
FASTING STATUS PATIENT QL REPORTED: YES
FSH SERPL IRP2-ACNC: 6.7 MIU/ML
GLUCOSE SERPL-MCNC: 99 MG/DL (ref 70–99)
HCT VFR BLD AUTO: 41.2 % (ref 35–47)
HDLC SERPL-MCNC: 70 MG/DL
HGB BLD-MCNC: 13.7 G/DL (ref 11.7–15.7)
LDLC SERPL CALC-MCNC: 77 MG/DL
MCH RBC QN AUTO: 30.8 PG (ref 26.5–33)
MCHC RBC AUTO-ENTMCNC: 33.3 G/DL (ref 31.5–36.5)
MCV RBC AUTO: 93 FL (ref 78–100)
NONHDLC SERPL-MCNC: 93 MG/DL
PLATELET # BLD AUTO: 195 10E3/UL (ref 150–450)
POTASSIUM SERPL-SCNC: 4.6 MMOL/L (ref 3.4–5.3)
PROT SERPL-MCNC: 7 G/DL (ref 6.4–8.3)
RBC # BLD AUTO: 4.45 10E6/UL (ref 3.8–5.2)
SODIUM SERPL-SCNC: 138 MMOL/L (ref 135–145)
TRIGL SERPL-MCNC: 80 MG/DL
WBC # BLD AUTO: 5.6 10E3/UL (ref 4–11)

## 2024-06-27 PROCEDURE — 80053 COMPREHEN METABOLIC PANEL: CPT | Performed by: INTERNAL MEDICINE

## 2024-06-27 PROCEDURE — 99214 OFFICE O/P EST MOD 30 MIN: CPT | Mod: 25 | Performed by: INTERNAL MEDICINE

## 2024-06-27 PROCEDURE — 36415 COLL VENOUS BLD VENIPUNCTURE: CPT | Performed by: INTERNAL MEDICINE

## 2024-06-27 PROCEDURE — 80061 LIPID PANEL: CPT | Performed by: INTERNAL MEDICINE

## 2024-06-27 PROCEDURE — 83001 ASSAY OF GONADOTROPIN (FSH): CPT | Performed by: INTERNAL MEDICINE

## 2024-06-27 PROCEDURE — 99396 PREV VISIT EST AGE 40-64: CPT | Performed by: INTERNAL MEDICINE

## 2024-06-27 PROCEDURE — 85027 COMPLETE CBC AUTOMATED: CPT | Performed by: INTERNAL MEDICINE

## 2024-06-27 RX ORDER — ROSUVASTATIN CALCIUM 10 MG/1
10 TABLET, COATED ORAL DAILY
Qty: 90 TABLET | Refills: 4 | Status: SHIPPED | OUTPATIENT
Start: 2024-06-27

## 2024-06-27 RX ORDER — GINSENG 100 MG
1 CAPSULE ORAL DAILY
COMMUNITY

## 2024-06-27 NOTE — PROGRESS NOTES
Office Visit - Physical   Yuliya Caro   53 year old  female    Date of visit: 6/27/2024  Physician: Ganesh Oquendo MD     Assessment and Plan   1. Annual physical exam  This is a 53-year-old woman with issues as discussed below.  Ongoing healthy lifestyle discussed and recommended    2. History of colonic polyps  Colonoscopy per routine    3. Mixed hyperlipidemia  Recommend rosuvastatin, discussed rationale again today she is in agreement  - rosuvastatin (CRESTOR) 10 MG tablet; Take 1 tablet (10 mg) by mouth daily  Dispense: 90 tablet; Refill: 4  - Comprehensive metabolic panel; Future  - CBC with platelets; Future  - Comprehensive metabolic panel  - CBC with platelets    4. Coronary artery calcification  - Lipid panel reflex to direct LDL Non-fasting; Future  - Lipid panel reflex to direct LDL Non-fasting    5. Primary open angle glaucoma (POAG) of both eyes, mild stage  Continue drops per ophthalmology    6. S/P hysterectomy  Will check an FSH to see if she is in menopause.  I think she is an excellent candidate for hormone replacement therapy.  She would prefer pill form of a patch from if she were to start this.  We reviewed risks and benefits of hormone replacement therapy post menopause.  We reviewed that she would not need a progestin.  She is going to consider this and we will discuss after her labs returned  - Follicle stimulating hormone; Future  - Follicle stimulating hormone    7. Varicose veins of left lower extremity with inflammation  Stable    8. Bacterial vaginosis  9. Yeast infection of the vagina  Management per gynecology.  Discussed potential benefits of hormone replacement therapy for symptoms of vaginitis.    Return in about 1 year (around 6/27/2025) for Routine preventive.     Chief Complaint   Chief Complaint   Patient presents with    Physical     Mammogram and annual at OBGYN yesterday          Patient Profile   Social History     Social History Narrative    Lives with her ,  Kendrick.  Two children, Dena (2000) [facility] [place], , and Cam (2002), [place], [place] .  Actor/talent.          Past Medical History   Patient Active Problem List   Diagnosis    S/P hysterectomy    Coronary artery calcification    Primary open angle glaucoma (POAG) of both eyes, mild stage    History of colonic polyps    Varicose veins of left lower extremity with inflammation       Past Surgical History  She has a past surgical history that includes colonoscopy; Mouth surgery; GYN surgery; GYN surgery; tubal ligation; Davinci Hysterectomy Total, Salpingectomy Bilateral (Bilateral, 4/17/2015); REMOVAL OF OVARIAN CYST(S) (Left, 1984); LIGATE FALLOPIAN TUBE; and Laparoscopic assisted hysterectomy vaginal (2016).     History of Present Illness   This 53 year old woman comes in for annual visit.  Overall she is feeling quite well.  We reviewed her medical history.  She recently saw gynecology.  Pap smear mammogram up-to-date.  Was found to have bacterial vaginosis and yeast infection.  She bikes quite a bit and feels that this contributes.  She had her uterus and 1 ovary removed and she is not sure if she is gone through menopause yet.    Review of Systems: A comprehensive review of systems was negative except as noted.     Medications and Allergies   Current Outpatient Medications   Medication Sig Dispense Refill    MAGNESIUM CITRATE PO Take 400 mg by mouth daily      Multiple Vitamin (MULTIVITAMIN ADULT PO) Take 1 tablet by mouth daily      nitroFURantoin macrocrystal-monohydrate (MACROBID) 100 MG capsule TAKE 1 CAPSULE BY MOUTH AS NEEDED FOR UTI PROPHYLAXIS.      Omega-3 Fatty Acids (OMEGA-3 PO) Take 1 Dose by mouth daily *Each dose has 1280 mg Omega 3, 1000U Vitamin D      rosuvastatin (CRESTOR) 10 MG tablet Take 1 tablet (10 mg) by mouth daily 90 tablet 4    timolol (TIMOPTIC) 0.5 % ophthalmic solution Place 1 drop into both eyes daily      zinc 50 MG TABS Take 1 tablet  "by mouth daily       Allergies   Allergen Reactions    Sulfa Antibiotics Hives        Family and Social History   Family History   Problem Relation Age of Onset    Parkinsonism Mother 70    Breast Cancer Mother     Other - See Comments Father         aging    Alcoholism Sister     Cirrhosis Sister     No Known Problems Brother     Irritable Bowel Syndrome Daughter     Lactose Intolerance Daughter     No Known Problems Son         Social History     Tobacco Use    Smoking status: Never    Smokeless tobacco: Never   Vaping Use    Vaping status: Never Used   Substance Use Topics    Alcohol use: Never     Comment: WINE OCCSIONALLY    Drug use: Never        Physical Exam   General Appearance:   No acute distress    /72 (BP Location: Left arm, Patient Position: Sitting, Cuff Size: Adult Regular)   Pulse 67   Temp (!) 96.6  F (35.9  C) (Tympanic)   Resp 13   Ht 1.664 m (5' 5.5\")   Wt 51.8 kg (114 lb 1.6 oz)   LMP  (LMP Unknown)   SpO2 99%   BMI 18.70 kg/m      EYES: Eyelids, conjunctiva, and sclera were normal. Pupils were normal. Cornea, iris, and lens were normal bilaterally.  HEAD, EARS, NOSE, MOUTH, AND THROAT: Head and face were normal. Hearing was normal to voice and the ears were normal to external exam. Nose appearance was normal and there was no discharge.   NECK: Neck appearance was normal.   RESPIRATORY: Breathing pattern was normal and the chest moved symmetrically.  Lung sounds were normal and there were no abnormal sounds.  CARDIOVASCULAR: Heart rate and rhythm were normal.  S1 and S2 were normal and there were no extra sounds or murmurs. Peripheral pulses in arms and legs were normal.  Jugular venous pressure was normal.  There was no peripheral edema.  GASTROINTESTINAL: The abdomen was normal in contour.   NEUROLOGIC: The patient was alert and oriented to person, place, time, and circumstance. Speech was normal. Cranial nerves were normal. Motor strength was normal for age. The patient was " normally coordinated.  PSYCHIATRIC:  Mood and affect were normal and the patient had normal recent and remote memory. The patient's judgment and insight were normal.     Additional Information        Ganesh Oquendo MD  Internal Medicine  Contact me at [phone]